# Patient Record
Sex: MALE | Race: WHITE | NOT HISPANIC OR LATINO | Employment: OTHER | URBAN - METROPOLITAN AREA
[De-identification: names, ages, dates, MRNs, and addresses within clinical notes are randomized per-mention and may not be internally consistent; named-entity substitution may affect disease eponyms.]

---

## 2017-12-27 ENCOUNTER — ALLSCRIPTS OFFICE VISIT (OUTPATIENT)
Dept: OTHER | Facility: OTHER | Age: 72
End: 2017-12-27

## 2017-12-27 LAB
BILIRUB UR QL STRIP: NEGATIVE
CLARITY UR: NORMAL
COLOR UR: YELLOW
GLUCOSE (HISTORICAL): NEGATIVE
HGB UR QL STRIP.AUTO: NORMAL
KETONES UR STRIP-MCNC: NEGATIVE MG/DL
LEUKOCYTE ESTERASE UR QL STRIP: NEGATIVE
NITRITE UR QL STRIP: NEGATIVE
PH UR STRIP.AUTO: 6.5 [PH]
PROT UR STRIP-MCNC: NEGATIVE MG/DL
SP GR UR STRIP.AUTO: 1.02
UROBILINOGEN UR QL STRIP.AUTO: 0.2

## 2018-01-23 VITALS
WEIGHT: 187 LBS | DIASTOLIC BLOOD PRESSURE: 84 MMHG | BODY MASS INDEX: 26.77 KG/M2 | HEIGHT: 70 IN | SYSTOLIC BLOOD PRESSURE: 156 MMHG

## 2018-05-27 DIAGNOSIS — C61 MALIGNANT NEOPLASM OF PROSTATE (HCC): ICD-10-CM

## 2018-06-27 ENCOUNTER — OFFICE VISIT (OUTPATIENT)
Dept: UROLOGY | Facility: MEDICAL CENTER | Age: 73
End: 2018-06-27
Payer: MEDICARE

## 2018-06-27 VITALS
BODY MASS INDEX: 27.96 KG/M2 | DIASTOLIC BLOOD PRESSURE: 84 MMHG | WEIGHT: 188.8 LBS | HEIGHT: 69 IN | SYSTOLIC BLOOD PRESSURE: 146 MMHG

## 2018-06-27 DIAGNOSIS — C61 MALIGNANT NEOPLASM OF PROSTATE (HCC): Primary | ICD-10-CM

## 2018-06-27 DIAGNOSIS — Z85.46 HISTORY OF MALIGNANT NEOPLASM OF PROSTATE: ICD-10-CM

## 2018-06-27 LAB
SL AMB  POCT GLUCOSE, UA: NEGATIVE
SL AMB LEUKOCYTE ESTERASE,UA: NEGATIVE
SL AMB POCT BILIRUBIN,UA: NEGATIVE
SL AMB POCT BLOOD,UA: ABNORMAL
SL AMB POCT CLARITY,UA: CLEAR
SL AMB POCT COLOR,UA: YELLOW
SL AMB POCT KETONES,UA: NEGATIVE
SL AMB POCT NITRITE,UA: NEGATIVE
SL AMB POCT PH,UA: 7
SL AMB POCT SPECIFIC GRAVITY,UA: 1.02
SL AMB POCT URINE PROTEIN: NEGATIVE
SL AMB POCT UROBILINOGEN: 1

## 2018-06-27 PROCEDURE — 81003 URINALYSIS AUTO W/O SCOPE: CPT | Performed by: UROLOGY

## 2018-06-27 PROCEDURE — 99215 OFFICE O/P EST HI 40 MIN: CPT | Performed by: UROLOGY

## 2018-06-27 RX ORDER — ASPIRIN 81 MG/1
TABLET ORAL DAILY
COMMUNITY
End: 2020-01-29

## 2018-06-27 RX ORDER — VALSARTAN AND HYDROCHLOROTHIAZIDE 160; 25 MG/1; MG/1
TABLET ORAL DAILY
COMMUNITY
Start: 2018-05-23

## 2018-06-27 RX ORDER — SILDENAFIL CITRATE 20 MG/1
20 TABLET ORAL AS NEEDED
COMMUNITY

## 2018-06-27 RX ORDER — ROSUVASTATIN CALCIUM 20 MG/1
TABLET, COATED ORAL DAILY
COMMUNITY
Start: 2018-05-23

## 2018-06-27 NOTE — PATIENT INSTRUCTIONS
Prostate Cancer   WHAT YOU NEED TO KNOW:   What do I need to know about prostate cancer? The prostate is the male sex gland that helps make semen  It is about the size of a walnut and wraps around the urethra  The urethra is the tube that carries urine from the bladder to the end of the penis  In most cases, prostate cancer is slow growing  What increases my risk for prostate cancer? · Age older than 48 years    · Father or brother with prostate cancer    · Regularly eating fried or high-fat foods    · Eating few fruits and vegetables    · Exposure to high amounts of certain chemicals, such as in cigarette smoke or alkaline batteries    · A sexually transmitted infection (STI)  What are the signs and symptoms of prostate cancer? You may have no symptoms during the early stages  In the later stages, you may have any of the following:  · Trouble starting or stopping the flow of urine    · Feeling the need to urinate often, especially at night    · Pain or a burning feeling when you urinate or ejaculate semen    · Trouble having an erection    · Blood in your urine or semen    · Not being able to urinate at all    · Pain or stiffness in your lower back, hips, or upper thighs  How is prostate cancer diagnosed? · Digital rectal examination (EDUARD)  is a test to check the size and shape of your prostate  Your healthcare provider will insert a gloved finger into your rectum to feel if your prostate is large, firm, or has lumps  · Prostate-specific antigen (PSA)  is a blood test to check PSA levels  These levels may be increased if you have prostate cancer  · A biopsy  is used to take a sample of your prostate gland to be tested for cancer  The sample may also help healthcare providers determine the stage of your cancer  How is prostate cancer treated? If you have early stage cancer, your healthcare provider may recommend that you have frequent tests and regular follow-up visits to watch for changes   You may also need any of the following:  · Hormone therapy  is medicine used to decrease testosterone (male hormone) levels  · Radiation therapy  is used to kill cancer cells with high-energy x-ray beams  You may receive radiation therapy from outside your body or from small beads or rods placed inside your prostate  · Surgery  may be needed, depending on the stage of the cancer  Part or all of your prostate may be removed  You may also need to have some lymph nodes taken out  This may help keep the cancer from spreading to other parts of your body  What can I do to manage my prostate cancer? · Do not smoke  Nicotine can damage blood vessels and make it more difficult to manage your prostate cancer  Smoking also increases your risk for new or returning cancer and delays healing after treatment  Do not use e-cigarettes or smokeless tobacco in place of cigarettes or to help you quit  They still contain nicotine  Ask your healthcare provider for information if you currently smoke and need help quitting  · Limit or do not drink alcohol as directed  Limit alcohol to 2 drinks per day  A drink is 12 ounces of beer, 1½ ounces of liquor, or 5 ounces of wine  · Eat a variety of healthy foods  Healthy foods include fruits, vegetables, whole-grain breads, low-fat dairy products, beans, lean meats, and fish  Your healthcare provider may also recommend changes to the amounts of calcium and vitamin D you have each day  · Manage your weight  Obesity may increase your risk for problems from prostate cancer  Limit or do not have high-calorie foods or drinks  · Exercise as directed  Exercise may help you recover after treatment and may help prevent your prostate cancer from returning  Exercise can also help you manage your weight  Try to get at least 30 minutes of exercise 5 days a week, such as walking  · Ask about sexual activity    Ask your healthcare provider when it is safe for you to start having sex after your treatment  Medicines may be given if you have trouble getting or maintaining an erection  · Manage incontinence  You may have incontinence (trouble controlling when you urinate) after treatment  Ask your healthcare provider for information on managing urinary incontinence  You may be able to gain control over your urination with techniques or medicines  · Drink liquids as directed  Ask how much liquid to drink each day and which liquids are best for you  Drink extra liquids to prevent dehydration  You will also need to replace fluid if you are vomiting or have diarrhea from cancer treatments  Call 911 for any of the following:   · Your leg feels warm, tender, and painful  It may look swollen and red  · You suddenly feel lightheaded and short of breath  · You have chest pain when you take a deep breath or cough  · You cough up blood  When should I contact my healthcare provider? · You have a fever  · You feel you cannot cope with your illness  · You have blood in your urine or have trouble urinating  · You have pain that does not get better after you take your medicine  · You have questions or concerns about your condition or care  CARE AGREEMENT:   You have the right to help plan your care  Learn about your health condition and how it may be treated  Discuss treatment options with your caregivers to decide what care you want to receive  You always have the right to refuse treatment  The above information is an  only  It is not intended as medical advice for individual conditions or treatments  Talk to your doctor, nurse or pharmacist before following any medical regimen to see if it is safe and effective for you  © 2017 [employer] Information is for End User's use only and may not be sold, redistributed or otherwise used for commercial purposes   All illustrations and images included in CareNotes® are the copyrighted property of [employer]  or [employer] Durbin

## 2018-06-27 NOTE — ASSESSMENT & PLAN NOTE
The patient is on active surveillance for his prostate cancer  His PSA has plateaued  He is known to have a very large prostate, 95 g by ultrasound in 2016  His PSA velocity and PSA density year consistent with benign disease  He will return in 6 months for re-evaluation

## 2018-06-27 NOTE — PROGRESS NOTES
Assessment/Plan:    Malignant neoplasm of prostate [facility])  The patient is on active surveillance for his prostate cancer  His PSA has plateaued  He is known to have a very large prostate, 95 g by ultrasound in 2016  His PSA velocity and PSA density year consistent with benign disease  He will return in 6 months for re-evaluation  Diagnoses and all orders for this visit:    Malignant neoplasm of prostate ([address] )  -     PSA, Total Screen; Future    History of malignant neoplasm of prostate  -     POCT urine dip auto non-scope    Other orders  -     rosuvastatin (CRESTOR) 20 MG tablet; daily  -     valsartan-hydrochlorothiazide (DIOVAN-HCT) 160-25 MG per tablet; daily  -     aspirin (ECOTRIN LOW STRENGTH) 81 mg EC tablet; Take by mouth daily  -     CALCIUM PO; Take by mouth daily  -     Ascorbic Acid (MAC-C PO); Take by mouth  -     sildenafil (REVATIO) 20 mg tablet; Take 20 mg by mouth as needed Take 3-5 tablets prn          Subjective:      Patient ID: Shiv Perry is a 71 y o  male  HPI  Prostate cancer:   Patient has been on active surveillance for approximately 4 years  He has no signs or symptoms of advancing disease  His PSA in May 2017 was 9 79  In December 2017 it was 11 90 and current level is 11 10  As of Dec 2017, PSA DT was about 4 years using last bx as endpoint  He notes urinary frequency, nocturia x 2  He denies other significant urinary symptoms  He denies gross hematuria, urinary tract infections or incontinence  He is taking  Neither medications nor supplements for his symptoms  Erectile dysfunction:   Using sildenafil successfully        The following portions of the patient's history were reviewed and updated as appropriate: allergies, current medications, past family history, past medical history, past social history, past surgical history and problem list     Review of Systems   Constitutional: Negative for activity change and fatigue     Respiratory: Negative for shortness of breath and wheezing  Cardiovascular: Negative for chest pain  HTN  Gastrointestinal: Negative for abdominal pain  Genitourinary: Negative for difficulty urinating, dysuria, frequency, hematuria and urgency  Musculoskeletal: Negative for back pain and gait problem  Skin: Negative  Allergic/Immunologic: Negative  Neurological: Negative  Psychiatric/Behavioral: Negative  Objective:      /84 (BP Location: Left arm, Patient Position: Sitting, Cuff Size: Standard)   Ht 5' 9" (1 753 m)   Wt 85 6 kg (188 lb 12 8 oz)   BMI 27 88 kg/m²          Physical Exam   Constitutional: He is oriented to person, place, and time  He appears well-developed and well-nourished  HENT:   Head: Normocephalic and atraumatic  Neck: Normal range of motion  Neck supple  Pulmonary/Chest: Effort normal    Genitourinary: Rectum normal    Genitourinary Comments: The prostate is normal in texture, estimated volume of 70 g  The right side is 2+ enlarged and the left side is 3+ enlarged  On previous exam the right side was noted to be a bit irregular, but that irregularity has disappeared  The tissue consistency is normal    Musculoskeletal: Normal range of motion  Neurological: He is alert and oriented to person, place, and time  He has normal reflexes  Skin: Skin is warm and dry  Psychiatric: He has a normal mood and affect   His behavior is normal  Judgment and thought content normal

## 2018-06-27 NOTE — LETTER
June 27, 2018     Arlet Razo MD  [address]    Patient: Malinda Osei   YOB: 1945   Date of Visit: 6/27/2018       Dear Dr Jovita Parrish: Thank you for referring Alexus Carlos to me for evaluation  Below are my notes for this consultation  If you have questions, please do not hesitate to call me  I look forward to following your patient along with you  Sincerely,        Michael Gar MD        CC: No Recipients  Michael Gar MD  6/27/2018 10:01 AM  Sign at close encounter  Assessment/Plan:    Malignant neoplasm of prostate [facility])  The patient is on active surveillance for his prostate cancer  His PSA has plateaued  He is known to have a very large prostate, 95 g by ultrasound in 2016  His PSA velocity and PSA density year consistent with benign disease  He will return in 6 months for re-evaluation  Diagnoses and all orders for this visit:    Malignant neoplasm of prostate ([facility] [address] )  -     PSA, Total Screen; Future    History of malignant neoplasm of prostate  -     POCT urine dip auto non-scope    Other orders  -     rosuvastatin (CRESTOR) 20 MG tablet; daily  -     valsartan-hydrochlorothiazide (DIOVAN-HCT) 160-25 MG per tablet; daily  -     aspirin (ECOTRIN LOW STRENGTH) 81 mg EC tablet; Take by mouth daily  -     CALCIUM PO; Take by mouth daily  -     Ascorbic Acid (MAC-C PO); Take by mouth  -     sildenafil (REVATIO) 20 mg tablet; Take 20 mg by mouth as needed Take 3-5 tablets prn          Subjective:      Patient ID: Malinda Osei is a 71 y o  male  HPI  Prostate cancer:   Patient has been on active surveillance for approximately 4 years  He has no signs or symptoms of advancing disease  His PSA in May 2017 was 9 79  In December 2017 it was 11 90 and current level is 11 10  As of Dec 2017, PSA DT was about 4 years using last bx as endpoint  He notes urinary frequency, nocturia x 2    He denies other significant urinary symptoms  He denies gross hematuria, urinary tract infections or incontinence  He is taking  Neither medications nor supplements for his symptoms  Erectile dysfunction:   Using sildenafil successfully        The following portions of the patient's history were reviewed and updated as appropriate: allergies, current medications, past family history, past medical history, past social history, past surgical history and problem list     Review of Systems   Constitutional: Negative for activity change and fatigue  Respiratory: Negative for shortness of breath and wheezing  Cardiovascular: Negative for chest pain  HTN  Gastrointestinal: Negative for abdominal pain  Genitourinary: Negative for difficulty urinating, dysuria, frequency, hematuria and urgency  Musculoskeletal: Negative for back pain and gait problem  Skin: Negative  Allergic/Immunologic: Negative  Neurological: Negative  Psychiatric/Behavioral: Negative  Objective:      /84 (BP Location: Left arm, Patient Position: Sitting, Cuff Size: Standard)   Ht 5' 9" (1 753 m)   Wt 85 6 kg (188 lb 12 8 oz)   BMI 27 88 kg/m²           Physical Exam   Constitutional: He is oriented to person, place, and time  He appears well-developed and well-nourished  HENT:   Head: Normocephalic and atraumatic  Neck: Normal range of motion  Neck supple  Pulmonary/Chest: Effort normal    Genitourinary: Rectum normal    Genitourinary Comments: The prostate is normal in texture, estimated volume of 70 g  The right side is 2+ enlarged and the left side is 3+ enlarged  On previous exam the right side was noted to be a bit irregular, but that irregularity has disappeared  The tissue consistency is normal    Musculoskeletal: Normal range of motion  Neurological: He is alert and oriented to person, place, and time  He has normal reflexes  Skin: Skin is warm and dry  Psychiatric: He has a normal mood and affect   His behavior is normal  Judgment and thought content normal

## 2018-06-27 NOTE — PROGRESS NOTES
IPSS Questionnaire (AUA-7): Over the past month    1)  How often have you had a sensation of not emptying your bladder completely after you finish urinating? 1 - Less than 1 time in 5   2)  How often have you had to urinate again less than two hours after you finished urinating? 2 - Less than half the time   3)  How often have you found you stopped and started again several times when you urinated? 1 - Less than 1 time in 5   4) How difficult have you found it to postpone urination? 1 - Less than 1 time in 5   5) How often have you had a weak urinary stream?  2 - Less than half the time   6) How often have you had to push or strain to begin urination? 0 - Not at all   7) How many times did you most typically get up to urinate from the time you went to bed until the time you got up in the morning?  0 - None   Total Score:  7     QOL: Pleased

## 2019-01-04 ENCOUNTER — OFFICE VISIT (OUTPATIENT)
Dept: UROLOGY | Facility: MEDICAL CENTER | Age: 74
End: 2019-01-04
Payer: MEDICARE

## 2019-01-04 VITALS
DIASTOLIC BLOOD PRESSURE: 78 MMHG | SYSTOLIC BLOOD PRESSURE: 138 MMHG | HEIGHT: 69 IN | WEIGHT: 182 LBS | BODY MASS INDEX: 26.96 KG/M2 | HEART RATE: 62 BPM

## 2019-01-04 DIAGNOSIS — C61 MALIGNANT NEOPLASM OF PROSTATE (HCC): Primary | ICD-10-CM

## 2019-01-04 LAB
SL AMB  POCT GLUCOSE, UA: ABNORMAL
SL AMB LEUKOCYTE ESTERASE,UA: ABNORMAL
SL AMB POCT BILIRUBIN,UA: ABNORMAL
SL AMB POCT BLOOD,UA: ABNORMAL
SL AMB POCT CLARITY,UA: CLEAR
SL AMB POCT COLOR,UA: YELLOW
SL AMB POCT KETONES,UA: ABNORMAL
SL AMB POCT NITRITE,UA: ABNORMAL
SL AMB POCT PH,UA: 7
SL AMB POCT SPECIFIC GRAVITY,UA: 1.01
SL AMB POCT URINE PROTEIN: ABNORMAL
SL AMB POCT UROBILINOGEN: 0.2

## 2019-01-04 PROCEDURE — 81003 URINALYSIS AUTO W/O SCOPE: CPT | Performed by: UROLOGY

## 2019-01-04 PROCEDURE — 99214 OFFICE O/P EST MOD 30 MIN: CPT | Performed by: UROLOGY

## 2019-01-04 NOTE — PROGRESS NOTES
Assessment/Plan:    Malignant neoplasm of prostate ([facility] [address] )  Continue active surveillance  The patient has elevated PSA is consistent with the size of his prostate  PSA doubling time of 6 6 years indicates essentially benign disease  Diagnoses and all orders for this visit:    Malignant neoplasm of prostate ([facility] [address] )  -     POCT urine dip auto non-scope  -     PSA, Total Screen; Future          Subjective:      Patient ID: Rubi Ruiz is a 68 y o  male  HPI  Prostate cancer:   Patient has been on active surveillance for approximately 4 years  He has no signs or symptoms of advancing disease  His PSA in May 2017 was 9 79  In December 2017 it was 11 90 and PSA on December 19, 2018 was 12  6       The patient had a PSA of 9 55 in September 2016  A biopsy in November 2016 was benign  The patient's prostate measured 95 g at that time  PSA doubling time since his last biopsy is 6 6 years, consistent with largely benign disease  PSA density 0 13  He notes urinary frequency, nocturia x 1  He denies other significant urinary symptoms  He denies gross hematuria, urinary tract infections or incontinence  He is taking neither medications nor supplements for his symptoms  AUA SYMPTOM SCORE      Most Recent Value   AUA SYMPTOM SCORE   How often have you had a sensation of not emptying your bladder completely after you finished urinating? 0   How often have you had to urinate again less than two hours after you finished urinating? 2   How often have you found you stopped and started again several times when you urinate? 1   How often have you found it difficult to postpone urination? 1   How often have you had a weak urinary stream?  1   How often have you had to push or strain to begin urination? 0   How many times did you most typically get up to urinate from the time you went to bed at night until the time you got up in the morning?   1   Quality of Life: If you were to spend the rest of your life with your urinary condition just the way it is now, how would you feel about that?  2   AUA SYMPTOM SCORE  6            Erectile dysfunction:   Using sildenafil successfully  The following portions of the patient's history were reviewed and updated as appropriate: allergies, current medications, past family history, past medical history, past social history, past surgical history and problem list     Review of Systems   Constitutional: Negative for activity change and fatigue  Respiratory: Negative for shortness of breath and wheezing  Cardiovascular: Negative for chest pain  Hypertension  Gastrointestinal: Negative for abdominal pain  Genitourinary: Negative for difficulty urinating, dysuria, frequency, hematuria and urgency  Musculoskeletal: Negative for back pain and gait problem  Skin: Negative  Allergic/Immunologic: Negative  Neurological: Negative  Psychiatric/Behavioral: Negative  Objective:      /78 (BP Location: Left arm, Patient Position: Sitting, Cuff Size: Adult)   Pulse 62   Ht 5' 9" (1 753 m)   Wt 82 6 kg (182 lb)   BMI 26 88 kg/m²          Physical Exam   Constitutional: He is oriented to person, place, and time  He appears well-developed and well-nourished  HENT:   Head: Normocephalic and atraumatic  Eyes: EOM are normal    Neck: Normal range of motion  Neck supple  Pulmonary/Chest: Effort normal    Genitourinary: Rectum normal    Genitourinary Comments: The prostate is 80 g, firm, smooth, nontender  I am unable to feel the base  The prostate measured 95 g by ultrasound in November 2016  Musculoskeletal: Normal range of motion  Neurological: He is alert and oriented to person, place, and time  Skin: Skin is warm and dry  Psychiatric: He has a normal mood and affect   His behavior is normal  Judgment and thought content normal

## 2019-01-05 NOTE — ASSESSMENT & PLAN NOTE
Continue active surveillance  The patient has elevated PSA is consistent with the size of his prostate  PSA doubling time of 6 6 years indicates essentially benign disease

## 2019-03-03 ENCOUNTER — OFFICE VISIT (OUTPATIENT)
Dept: URGENT CARE | Facility: MEDICAL CENTER | Age: 74
End: 2019-03-03
Payer: MEDICARE

## 2019-03-03 VITALS
SYSTOLIC BLOOD PRESSURE: 130 MMHG | WEIGHT: 182 LBS | DIASTOLIC BLOOD PRESSURE: 68 MMHG | HEIGHT: 69 IN | OXYGEN SATURATION: 95 % | RESPIRATION RATE: 16 BRPM | BODY MASS INDEX: 26.96 KG/M2 | TEMPERATURE: 96.9 F | HEART RATE: 67 BPM

## 2019-03-03 DIAGNOSIS — L02.415 CELLULITIS AND ABSCESS OF RIGHT LEG: Primary | ICD-10-CM

## 2019-03-03 DIAGNOSIS — L03.115 CELLULITIS AND ABSCESS OF RIGHT LEG: Primary | ICD-10-CM

## 2019-03-03 PROCEDURE — 90715 TDAP VACCINE 7 YRS/> IM: CPT

## 2019-03-03 PROCEDURE — 99213 OFFICE O/P EST LOW 20 MIN: CPT | Performed by: FAMILY MEDICINE

## 2019-03-03 PROCEDURE — G0463 HOSPITAL OUTPT CLINIC VISIT: HCPCS | Performed by: FAMILY MEDICINE

## 2019-03-03 RX ORDER — AZITHROMYCIN 250 MG/1
TABLET, FILM COATED ORAL
Qty: 6 TABLET | Refills: 0 | Status: SHIPPED | OUTPATIENT
Start: 2019-03-03 | End: 2019-03-07

## 2019-03-03 NOTE — PROGRESS NOTES
[address]        NAME: Morena Mckeon is a 68 y o  male  : 1945    MRN: [id number]  DATE: March 3, 2019  TIME: 2:22 PM    Assessment and Plan   Cellulitis and abscess of right leg [L03 115, L02 415]  1  Cellulitis and abscess of right leg  TDAP Vaccine greater than or equal to 6yo    azithromycin (ZITHROMAX) 250 mg tablet         Patient Instructions       Follow up with PCP in 3-5 days  Proceed to  ER if symptoms worsen  Chief Complaint     Chief Complaint   Patient presents with    Leg Injury     Patient here with a right leg injury from yesterday  Sates a metal chair hit his leg  History of Present Illness       Patient here today because he sustained right leg injury when he scratches right lower leg on a metal chair when he was arriving from a trip from [facility] at the airport  She denies any significant bleeding  However area around the right shin bone is tender to touch  He cannot recall the last time he had a tetanus  Denies any fever chills  Review of Systems   Review of Systems   Skin: Positive for rash and wound           Current Medications       Current Outpatient Medications:     Ascorbic Acid (MAC-C PO), Take by mouth, Disp: , Rfl:     aspirin (ECOTRIN LOW STRENGTH) 81 mg EC tablet, Take by mouth daily, Disp: , Rfl:     CALCIUM PO, Take by mouth daily, Disp: , Rfl:     rosuvastatin (CRESTOR) 20 MG tablet, daily, Disp: , Rfl:     valsartan-hydrochlorothiazide (DIOVAN-HCT) 160-25 MG per tablet, daily, Disp: , Rfl:     azithromycin (ZITHROMAX) 250 mg tablet, Take 2 tablets today then 1 tablet daily x 4 days, Disp: 6 tablet, Rfl: 0    sildenafil (REVATIO) 20 mg tablet, Take 20 mg by mouth as needed Take 3-5 tablets prn, Disp: , Rfl:     Current Allergies     Allergies as of 2019 - Reviewed 2019   Allergen Reaction Noted    Penicillins  2017            The following portions of the patient's history were reviewed and updated as appropriate: allergies, current medications, past family history, past medical history, past social history, past surgical history and problem list      Past Medical History:   Diagnosis Date    BPH without obstruction/lower urinary tract symptoms     Elevated PSA     Erectile dysfunction     Hypercholesteremia     Hypertension     Malignant neoplasm prostate ([address] )     Prostate nodule        Past Surgical History:   Procedure Laterality Date    PROSTATE BIOPSY Bilateral 2014, 2016       Family History   Problem Relation Age of Onset    Breast cancer Sister     Prostate cancer Brother     Heart attack Brother          Medications have been verified  Objective   /68   Pulse 67   Temp (!) 96 9 °F (36 1 °C) (Tympanic)   Resp 16   Ht 5' 9" (1 753 m)   Wt 82 6 kg (182 lb)   SpO2 95%   BMI 26 88 kg/m²        Physical Exam     Physical Exam   Skin: Skin is warm  Right pretibial area reveals superficial abrasion measuring about 8 cm in length by 1 cm why  It is slightly erythematous swollen pretibial area 1 to 2+  No calf tenderness elicited  Findings consistent with cellulitis

## 2019-03-15 ENCOUNTER — TRANSCRIBE ORDERS (OUTPATIENT)
Dept: ADMINISTRATIVE | Facility: HOSPITAL | Age: 74
End: 2019-03-15

## 2019-03-15 DIAGNOSIS — R06.83 SNORING: Primary | ICD-10-CM

## 2019-07-26 ENCOUNTER — OFFICE VISIT (OUTPATIENT)
Dept: SLEEP CENTER | Facility: CLINIC | Age: 74
End: 2019-07-26
Payer: MEDICARE

## 2019-07-26 VITALS
SYSTOLIC BLOOD PRESSURE: 128 MMHG | DIASTOLIC BLOOD PRESSURE: 68 MMHG | HEIGHT: 69 IN | WEIGHT: 186 LBS | BODY MASS INDEX: 27.55 KG/M2 | HEART RATE: 70 BPM

## 2019-07-26 DIAGNOSIS — R06.83 SNORING: ICD-10-CM

## 2019-07-26 DIAGNOSIS — G47.19 EXCESSIVE DAYTIME SLEEPINESS: ICD-10-CM

## 2019-07-26 DIAGNOSIS — F41.9 ANXIETY: ICD-10-CM

## 2019-07-26 DIAGNOSIS — G25.81 RESTLESS LEG SYNDROME: ICD-10-CM

## 2019-07-26 DIAGNOSIS — E66.3 OVERWEIGHT (BMI 25.0-29.9): ICD-10-CM

## 2019-07-26 DIAGNOSIS — G47.33 OSA (OBSTRUCTIVE SLEEP APNEA): Primary | ICD-10-CM

## 2019-07-26 PROCEDURE — 99204 OFFICE O/P NEW MOD 45 MIN: CPT | Performed by: INTERNAL MEDICINE

## 2019-07-26 NOTE — PROGRESS NOTES
Review of Systems      Genitourinary none   Cardiology none   Gastrointestinal none   Neurology none   Constitutional fatigue   Integumentary none   Psychiatry anxiety   Musculoskeletal back pain   Pulmonary snoring   ENT none   Endocrine none   Hematological none

## 2019-07-26 NOTE — PROGRESS NOTES
Consultation - Spencer Chappell  68 y o  male  :1945  MIX:[id number]    Physician Requesting Consult: Mark Fletcher MD     Reason for Consult : At your kind request I saw this patient for initial sleep evaluation today  He is here to evaluate for suspected Obstructive Sleep Apnea  PFSH, Problem List, Medications & Allergies were reviewed in EMR  He  has a past medical history of BPH without obstruction/lower urinary tract symptoms, Elevated PSA, Erectile dysfunction, Hypercholesteremia, Hypertension, Malignant neoplasm prostate ([address] ), and Prostate nodule  He has a current medication list which includes the following prescription(s): ascorbic acid, aspirin, calcium, rosuvastatin, sildenafil, and valsartan-hydrochlorothiazide  HPI:  Wife reports he has been snoring forever  It is loud and disturbs her to the extent that she frequently has to sleep in another room  He is not aware of snoring, breathing difficulties during sleep or modifying factors  Other Complaints:  Recent anxiety associated with a move for which she was started on mirtazapine  Restless Leg Syndrome: has typical symptoms but occurring rarely and not affecting sleep Parasomnia: no features reported   Sleep Routine:  He dozes for approximately an hour or 2 in the evenings Typical Bedtime:11:00 p m  Gets OOB:  5:30 a m  TIB:6 5 hrs  Sleep latency:< 15 minutes Sleep Interruptions:infrequent x/night   Awakens: with aid of an alarm  Upon awakening:is not always refreshed He estimates getting >7 5 hrs sleep  He has Excessive Daytime Sleepiness and naps in the afternoons when he has the opportunity  [place] Sleepiness Scale rated at Total score: 24  Habits: reports that he has never smoked  He has never used smokeless tobacco , reports that he drinks alcohol ,  reports that he does not use drugs  ,Caffeine use:limited , Exercise routine: none but is physically active in his part-time job  Family History:  Son has obstructive sleep apnea  ROS: reviewed & as attached  Significant for weight has been stable  He reported no nasal, respiratory or cardiac symptoms  Anxiety is controlled on current medication  EXAM:    Vitals /68   Pulse 70   Ht 5' 9" (1 753 m)   Wt 84 4 kg (186 lb)   BMI 27 47 kg/m²     General  Well groomed male; appears stated age; no apparent distress  Psychiatric   Speech:clear and coherent; Cooperative  Normal mood, affect & thought    Head   Craniofacial anatomy:normal Sinuses: non- tender  TMJ: Normal     Eyes   EOM's intact;  conjunctiva/corneas clear         Nasal Airway  is patent Septum:central, Mucous membranes:appear normal     Turbinates: normal ;  No Rhinorrhea; No PND  Oral   Airway  crowded Tongue:  ; Modified Mallampati class IV (only hard palate visible)  Hard Palate:normal ;Soft Palate:  redundant soft palate Tonsils: no hypertrophy  Teeth: normal       Neck  is lean; Neck Circumference: 14cm; Supple; no abnormal masses; Thyroid:normal  Trachea:central      Lymph    No Cervical or Submandibular Lymhadenopathy   Heart:   S1,S2 normal;RRR; no gallop; nomurmurs     Lungs   Respiratory Effort:normal  Air entry good bilaterally  No wheezes  No rales   Abdomen   Obese, Soft & non-tender     Extremities   No pedal edema  No clubbing or cyanosis  Skin   Skin is warm and dry; Color& Hydration good; no facial rashes or lesions    Neurologic  Alert and orientated; CNII-XII intact; Motor normal; Sensation normal    Muscskeltl    Muscle bulk, tone and power WNL Gait:normal           IMPRESSION: Primary Sleep/Secondary(to Medical or Psych conditions) & comorbidities   1  GABRIELLE (obstructive sleep apnea)  Diagnostic Sleep Study    CPAP Study   2  Snoring  Ambulatory referral to Sleep Medicine   3  Excessive daytime sleepiness     4  Restless leg syndrome     5  Anxiety     6  Overweight (BMI 25 0-29  9)        PLAN:   1   Comprehensive counseling was provided on pathophysiology, diagnostic strategies & treatment options; effects on symptoms and comorbidities; risks of inadequate therapy; costs and insurance aspects  2  I advised on weight reduction, avoiding sleeping supine, using alcohol or sedating medications close to bed time and on safe driving practices  3  Nocturnal polysomnography is indicated and a diagnostic study will be scheduled  4  Patient is willing to try Positive airway pressure therapy and will be scheduled for a titration study if indicated  5  Once treated, he may be able to discontinue mirtazapine  6  Follow-up will be scheduled after the studies to review results, further details of treatment options and to initiate/adjust therapy  Thank you for allowing me to participate in the care of this patient  I will keep you apprised of developments      Sincerely,     Authenticated electronically by Lexie Ariza MD   on 77/88/58   Board Certified Specialist

## 2019-09-18 ENCOUNTER — HOSPITAL ENCOUNTER (OUTPATIENT)
Dept: SLEEP CENTER | Facility: CLINIC | Age: 74
Discharge: HOME | End: 2019-09-18
Payer: MEDICARE

## 2019-09-18 DIAGNOSIS — G47.33 OSA (OBSTRUCTIVE SLEEP APNEA): ICD-10-CM

## 2019-09-18 PROCEDURE — 95810 POLYSOM 6/> YRS 4/> PARAM: CPT

## 2019-09-19 NOTE — PROGRESS NOTES
Sleep Study Documentation    Pre-Sleep Study       Sleep testing procedure explained to patient:YES    Patient napped prior to study:NO    Caffeine:Dayshift worker after 12PM   Caffeine use:YES- coffee  6 ounces and soda  12 ounces    Alcohol:Dayshift workers after 5PM: Alcohol use:NO    Typical day for patient:YES       Study Documentation    Sleep Study Indications: BMI > 30, EDS, Un-refreshing Sleep    Sleep Study: Diagnostic   Snore: Moderate  Supplemental O2: no    O2 flow rate (L/min) range N/A  O2 flow rate (L/min) final N/A  Minimum SaO2 81%  Baseline SaO2 98%        Mode of Therapy: N/A    EKG abnormalities: no     EEG abnormalities: no    Sleep Study Recorded < 2 hours: N/A    Sleep Study Recorded > 2 hours but incomplete study: N/A    Sleep Study Recorded 6 hours but no sleep obtained: NO    Patient classification: retired       Post-Sleep Study    Medication used at bedtime or during sleep study:YES other prescription medications    Patient reports time it took to fall asleep:less than 20 minutes    Patient reports waking up during study:1 to 2 times  Patient reports returning to sleep without difficulty  Patient reports sleeping 4 to 6 hours without dreaming  Patient reports sleep during study:typical    Patient rated sleepiness: Somewhat sleepy or tired    PAP treatment:no

## 2019-09-24 ENCOUNTER — TELEPHONE (OUTPATIENT)
Dept: SLEEP CENTER | Facility: CLINIC | Age: 74
End: 2019-09-24

## 2019-09-24 NOTE — TELEPHONE ENCOUNTER
Left message for patient, advised sleep study reveals GABRIELLE, recommend titration study, already scheduled for 10/29/19 in [place]  Advised to call with any questions or concerns

## 2019-09-25 NOTE — TELEPHONE ENCOUNTER
Patient called back he rescheduled his sleep study because of vacation conflict     Rescheduled patient for first available sleep study in January

## 2019-12-27 ENCOUNTER — TELEPHONE (OUTPATIENT)
Dept: UROLOGY | Facility: MEDICAL CENTER | Age: 74
End: 2019-12-27

## 2019-12-27 DIAGNOSIS — C61 MALIGNANT NEOPLASM OF PROSTATE (HCC): Primary | ICD-10-CM

## 2019-12-27 NOTE — TELEPHONE ENCOUNTER
Patient of Dr Carlos Finch  Patient appointment changed to 01/29/20 and he is requesting date on psa order to be updated  Patient does not go to [address] [place] [address] and he is requesting a copy to be mailed

## 2019-12-27 NOTE — TELEPHONE ENCOUNTER
Called and spoke to patient  New script was placed in the chart and is being mailed to address on file  Address confirmed  Patient understood and will call with any questions

## 2020-01-10 ENCOUNTER — HOSPITAL ENCOUNTER (OUTPATIENT)
Dept: SLEEP CENTER | Facility: CLINIC | Age: 75
Discharge: HOME | End: 2020-01-10
Payer: MEDICARE

## 2020-01-10 DIAGNOSIS — G47.33 OSA (OBSTRUCTIVE SLEEP APNEA): ICD-10-CM

## 2020-01-10 PROCEDURE — 95811 POLYSOM 6/>YRS CPAP 4/> PARM: CPT

## 2020-01-11 DIAGNOSIS — G47.33 OSA (OBSTRUCTIVE SLEEP APNEA): Primary | ICD-10-CM

## 2020-01-11 NOTE — PROGRESS NOTES
Sleep Study Documentation    Pre-Sleep Study       Sleep testing procedure explained to patient:YES    Patient napped prior to study:YES- less than 30 minutes  Napped after 2PM: no    Caffeine:Dayshift worker after 12PM   Caffeine use:YES- coffee  6 to 18 ounces    Alcohol:Dayshift workers after 5PM: Alcohol use:NO    Typical day for patient:YES       Study Documentation    Sleep Study Indications:  GABRIELLE-diagnosed in-lab study [facility]    Sleep Study: Treatment   Optimal PAP pressure:  9 cm H2O  Leak:Small  Snore:Eliminated  REM Obtained:yes  Supplemental O2: no    Minimum SaO2  88 %  Baseline SaO2  94 9 %  PAP mask tried (list all)  [employer] 2 Medium Nasal Mask and heated humidity  PAP mask choice (final) same  PAP mask type:nasal  PAP pressure at which snoring was eliminated  7 cm H2O  Minimum SaO2 at final PAP pressure  93 %  Mode of Therapy:CPAP  ETCO2:No  CPAP changed to BiPAP:No    Mode of Therapy:CPAP    EKG abnormalities: no     EEG abnormalities: no    Sleep Study Recorded < 2 hours: N/A    Sleep Study Recorded > 2 hours but incomplete study: N/A    Sleep Study Recorded 6 hours but no sleep obtained: NO          Post-Sleep Study    Medication used at bedtime or during sleep study:YES other prescription medications    Patient reports time it took to fall asleep:less than 20 minutes    Patient reports waking up during study:1 to 2 times  Patient reports returning to sleep in 10 to 30 minutes  Patient reports sleeping 4 to 6 hours without dreaming  Patient reports sleep during study:typical    Patient rated sleepiness: Somewhat sleepy or tired    PAP treatment:yes: Post PAP treatment patient reports feeling unchanged and would wear PAP mask at home

## 2020-01-13 ENCOUNTER — TELEPHONE (OUTPATIENT)
Dept: SLEEP CENTER | Facility: CLINIC | Age: 75
End: 2020-01-13

## 2020-01-14 ENCOUNTER — TELEPHONE (OUTPATIENT)
Dept: SLEEP CENTER | Facility: CLINIC | Age: 75
End: 2020-01-14

## 2020-01-14 NOTE — TELEPHONE ENCOUNTER
Left message for patient to call office  Dr Micky Myles recommends CPAP 9 cm  Patient will need follow up and DME set up

## 2020-01-15 ENCOUNTER — TELEPHONE (OUTPATIENT)
Dept: SLEEP CENTER | Facility: CLINIC | Age: 75
End: 2020-01-15

## 2020-01-15 NOTE — TELEPHONE ENCOUNTER
Spoke with patient  Advised that Dr Missy Ghosh recommends CPAP  DME set up and compliance scheduled      DME appointment information emailed to [facility]

## 2020-01-29 ENCOUNTER — OFFICE VISIT (OUTPATIENT)
Dept: UROLOGY | Facility: MEDICAL CENTER | Age: 75
End: 2020-01-29
Payer: MEDICARE

## 2020-01-29 VITALS
HEIGHT: 69 IN | SYSTOLIC BLOOD PRESSURE: 130 MMHG | HEART RATE: 66 BPM | DIASTOLIC BLOOD PRESSURE: 82 MMHG | WEIGHT: 186 LBS | BODY MASS INDEX: 27.55 KG/M2

## 2020-01-29 DIAGNOSIS — Z85.46 HISTORY OF MALIGNANT NEOPLASM OF PROSTATE: ICD-10-CM

## 2020-01-29 DIAGNOSIS — C61 MALIGNANT NEOPLASM OF PROSTATE (HCC): Primary | ICD-10-CM

## 2020-01-29 LAB
SL AMB  POCT GLUCOSE, UA: NEGATIVE
SL AMB LEUKOCYTE ESTERASE,UA: NEGATIVE
SL AMB POCT BILIRUBIN,UA: NEGATIVE
SL AMB POCT BLOOD,UA: NEGATIVE
SL AMB POCT CLARITY,UA: CLEAR
SL AMB POCT COLOR,UA: YELLOW
SL AMB POCT KETONES,UA: NEGATIVE
SL AMB POCT NITRITE,UA: NEGATIVE
SL AMB POCT PH,UA: 6.5
SL AMB POCT SPECIFIC GRAVITY,UA: >=1.03
SL AMB POCT URINE PROTEIN: NEGATIVE
SL AMB POCT UROBILINOGEN: 0.2

## 2020-01-29 PROCEDURE — 99214 OFFICE O/P EST MOD 30 MIN: CPT | Performed by: UROLOGY

## 2020-01-29 PROCEDURE — 81003 URINALYSIS AUTO W/O SCOPE: CPT | Performed by: UROLOGY

## 2020-01-29 NOTE — ASSESSMENT & PLAN NOTE
PSA continues to rise albeit gradually  Prolonged doubling time most consistent with benign disease  The prostate has not been imaged in for years and an MRI has been ordered

## 2020-01-29 NOTE — PROGRESS NOTES
Assessment/Plan:    Malignant neoplasm of prostate ([facility]CC)  PSA continues to rise albeit gradually  Prolonged doubling time most consistent with benign disease  The prostate has not been imaged in for years and an MRI has been ordered  Diagnoses and all orders for this visit:    Malignant neoplasm of prostate ([address] )  -     POCT urine dip auto non-scope  -     MRI prostate multiparametric wo w contrast; Future  -     PSA, Total Screen; Future  -     Basic metabolic panel; Future    History of malignant neoplasm of prostate          Subjective:      Patient ID: Eligio Bell is a 69 y o  male  HPI  Prostate cancer:   The patient had a PSA of 9 55 on August 24, 2016  A biopsy in November 2016 showed [place] 6 carcinoma in 1 core  The diagnosis was confirmed at [facility]  The patient's prostate measured 95 g at that time  Patient has been on active surveillance for approximately since November 2016  He has no signs or symptoms of advancing disease   His PSA in May 2017 was 9 79   In December 2017 it was 11 90 and PSA on December 19, 2018 was 12  6       He notes urinary frequency and nocturia x 1  He denies other significant urinary symptoms  He denies gross hematuria, urinary tract infections or incontinence  He is taking neither medications nor supplements for his symptoms  PSA:  13 30 on January 15, 2020  PSA doubling time since his last biopsy is 7 1 years, consistent with largely benign disease  AUA SYMPTOM SCORE      Most Recent Value   AUA SYMPTOM SCORE   How often have you had a sensation of not emptying your bladder completely after you finished urinating? 1   How often have you had to urinate again less than two hours after you finished urinating? 2   How often have you found you stopped and started again several times when you urinate?  0   How often have you found it difficult to postpone urination?   1   How often have you had a weak urinary stream?  1   How often have you had to push or strain to begin urination? 0   How many times did you most typically get up to urinate from the time you went to bed at night until the time you got up in the morning? 1   Quality of Life: If you were to spend the rest of your life with your urinary condition just the way it is now, how would you feel about that?  1   AUA SYMPTOM SCORE  6          The following portions of the patient's history were reviewed and updated as appropriate: allergies, current medications, past family history, past medical history, past social history, past surgical history and problem list     Review of Systems   Constitutional: Negative for activity change and fatigue  Respiratory: Negative for shortness of breath and wheezing  Cardiovascular: Negative for chest pain  Hypertension  Gastrointestinal: Negative for abdominal pain  Genitourinary: Negative for difficulty urinating, dysuria, frequency, hematuria and urgency  Musculoskeletal: Negative for back pain and gait problem  Skin: Negative  Allergic/Immunologic: Negative  Neurological: Negative  Psychiatric/Behavioral: Negative  Objective:      /82 (BP Location: Left arm, Patient Position: Sitting, Cuff Size: Standard)   Pulse 66   Ht 5' 9" (1 753 m)   Wt 84 4 kg (186 lb)   BMI 27 47 kg/m²          Physical Exam   Constitutional: He is oriented to person, place, and time  He appears well-developed and well-nourished  HENT:   Head: Normocephalic and atraumatic  Eyes: EOM are normal    Neck: Normal range of motion  Neck supple  Pulmonary/Chest: Effort normal    Genitourinary: Rectum normal    Genitourinary Comments: The prostate is 80 g, firm, smooth, nontender  Right side a bit larger than the left  It was 95 gm by u/s 2016  Musculoskeletal: Normal range of motion  Neurological: He is alert and oriented to person, place, and time  Skin: Skin is warm and dry     Psychiatric: He has a normal mood and affect   His behavior is normal  Judgment and thought content normal

## 2020-02-05 ENCOUNTER — TELEPHONE (OUTPATIENT)
Dept: SLEEP CENTER | Facility: CLINIC | Age: 75
End: 2020-02-05

## 2020-02-14 ENCOUNTER — HOSPITAL ENCOUNTER (OUTPATIENT)
Dept: RADIOLOGY | Age: 75
Discharge: HOME | End: 2020-02-14
Payer: MEDICARE

## 2020-02-14 DIAGNOSIS — C61 MALIGNANT NEOPLASM OF PROSTATE (HCC): ICD-10-CM

## 2020-02-14 PROCEDURE — 76377 3D RENDER W/INTRP POSTPROCES: CPT

## 2020-02-14 PROCEDURE — 72197 MRI PELVIS W/O & W/DYE: CPT

## 2020-02-14 PROCEDURE — A9585 GADOBUTROL INJECTION: HCPCS | Performed by: UROLOGY

## 2020-02-14 RX ADMIN — GADOBUTROL 8 ML: 604.72 INJECTION INTRAVENOUS at 12:01

## 2020-02-21 ENCOUNTER — TELEPHONE (OUTPATIENT)
Dept: UROLOGY | Facility: MEDICAL CENTER | Age: 75
End: 2020-02-21

## 2020-02-21 NOTE — TELEPHONE ENCOUNTER
Call placed to patient and detailed message left on answering machine with recommendations made by Dr Courtney Michel  Office number provided for patient to call back with any questions or concerns  Office number provided

## 2020-02-21 NOTE — TELEPHONE ENCOUNTER
----- Message from Dulce Maria Wiley MD sent at 2/21/2020  3:05 PM EST -----  MRI shows a very low risk of clinically significant prostate cancer  We will continue with active surveillance  He should return in 1 year  Please inform the patient  Thank you

## 2020-03-13 ENCOUNTER — OFFICE VISIT (OUTPATIENT)
Dept: SLEEP CENTER | Facility: CLINIC | Age: 75
End: 2020-03-13
Payer: MEDICARE

## 2020-03-13 VITALS
SYSTOLIC BLOOD PRESSURE: 134 MMHG | BODY MASS INDEX: 27.09 KG/M2 | HEIGHT: 70 IN | WEIGHT: 189.2 LBS | DIASTOLIC BLOOD PRESSURE: 70 MMHG | HEART RATE: 70 BPM

## 2020-03-13 DIAGNOSIS — G47.19 EXCESSIVE DAYTIME SLEEPINESS: ICD-10-CM

## 2020-03-13 DIAGNOSIS — G25.81 RESTLESS LEG SYNDROME: ICD-10-CM

## 2020-03-13 DIAGNOSIS — F41.9 ANXIETY: ICD-10-CM

## 2020-03-13 DIAGNOSIS — E66.3 OVERWEIGHT (BMI 25.0-29.9): ICD-10-CM

## 2020-03-13 DIAGNOSIS — R68.2 DRY MOUTH: ICD-10-CM

## 2020-03-13 DIAGNOSIS — G47.33 OSA (OBSTRUCTIVE SLEEP APNEA): Primary | ICD-10-CM

## 2020-03-13 PROCEDURE — 99214 OFFICE O/P EST MOD 30 MIN: CPT | Performed by: INTERNAL MEDICINE

## 2020-03-13 RX ORDER — MIRTAZAPINE 15 MG/1
15 TABLET, FILM COATED ORAL DAILY
COMMUNITY
Start: 2020-03-03

## 2020-03-13 NOTE — PROGRESS NOTES
Review of Systems      Genitourinary need to urinate more than twice a night and none   Cardiology Frequent chest pain or angina,  and none   Gastrointestinal none   Neurology frequent headaches   Constitutional none   Integumentary none   Psychiatry anxiety   Musculoskeletal none   Pulmonary snoring   ENT none   Endocrine none   Hematological none

## 2020-03-13 NOTE — PROGRESS NOTES
Follow-Up Note - Spencer Chappell  69 y o  male  :1945  DJC:[id number]    CC: I saw this patient for follow-up in clinic today for his Sleep Disordered Breathing, Coexisting Sleep and Medical Problems  The patient had both diagnostic and therapeutic sleep studies [and is here to review results and to adjust therapy]  The diagnostic study confirmed moderate obstructive sleep apnea:  AHI 17/hour [considerably] [higher while supine]  [Intermittent] snoring [of moderate intensity] was noted  Minimum oxygen saturation 81 %  12 6 minutes [of total sleep time was spent with saturations less than 90%]  There were also severe periodic limb movements of sleep  During the subsequent therapeutic study, sleep disordered breathing was adequately remediated with PAP at 9 cm H2O  There were mild periodic limb movements of sleep  PFSH, Problem List, Medications & Allergies were reviewed in EMR  Interval changes: none reported  He  has a past medical history of BPH without obstruction/lower urinary tract symptoms, Elevated PSA, Erectile dysfunction, Hypercholesteremia, Hypertension, Malignant neoplasm prostate ([address] ), and Prostate nodule  He has a current medication list which includes the following prescription(s): ascorbic acid, calcium, mirtazapine, rosuvastatin, valsartan-hydrochlorothiazide, and sildenafil  ROS: A 10 point review of systems undertaken (as attached)  Significant for is not experiencing headaches or chest pain  Anxiety is controlled  [place] DATA REVIEWED:  using PAP > 4 hours/night 97% of the time  Estimated IMELDA 4 7/hour at pressure of 9cm H2O    SUBJECTIVE: Regarding use of PAP, Huey Shafer reports:   · He is experiencing minor adverse effects: dry mouth/throat  · He is   benefiting from use: no longer snoring / having breathing difficulties   · Restless leg symptoms occur infrequently    Sleep Routine: He reports getting 7 hrs sleep  ; he has no difficulty initiating or maintaining sleep   He awakens spontaneously and feels refreshed  He denied excessive drowsiness but would doze off if sedentary  He rated himself at Total score: 8 /24 on the [place] sleepiness scale  Habits: reports that he has never smoked  He has never used smokeless tobacco ,  reports that he drinks alcohol ,  reports that he does not use drugs  , Caffeine use: limited , Exercise routine: regular    OBJECTIVE: /70   Pulse 70   Ht 5' 10" (1 778 m)   Wt 85 8 kg (189 lb 3 2 oz)   BMI 27 15 kg/m²    Constitutional: Patient is well groomed; well appearing  Skin/Extrem: warm & dry; col & hydration normal; no edema  Psych: cooperativeand in no distress  Mental State appears normal   CNS: Alert, orientated, clear & coherent speech  H&N: EOMI; NC/AT:no facial pressure marks, no rashes  Neck Circumference: 16"  ENMT Mucus membranes normal Nasal airway:patent  Oral airway: crowded  Resp:effort is normal CVS: RRR ABD:truncal obesity MSK:Gait normal     ASSESSMENT: Primary Sleep/Secondary(to Medical or Psych conditions) & comorbidities   1  GABRIELLE (obstructive sleep apnea)  PAP DME Pressure Change    PAP DME Resupply/Reorder   2  Restless leg syndrome     3  Excessive daytime sleepiness     4  Dry mouth     5  Anxiety     6  Overweight (BMI 25 0-29  9)       PLAN:  1  Treatment with  PAP is medically necessary and Susan Arambula is agreable to continue use  2  Care of equipment, methods to improve comfort using PAP and importance of compliance with therapy were discussed  3  Pressure setting: change 10 cmH2O     4  Rx provided to replace supplies and Care coordinated with DME provider  5  Strategies for weight reduction were discussed  6  No medication is needed for restless leg symptoms/periodic limb movements of sleep  7  Follow-up is advised in 1 year or sooner if needed to monitor progress, compliance and to adjust therapy        Thank you for allowing me to participate in the care of this patient      Sincerely,    Authenticated electronically by Dara Rosario MD on 37/60/75   Board Certified Specialist

## 2020-03-16 ENCOUNTER — TELEPHONE (OUTPATIENT)
Dept: SLEEP CENTER | Facility: CLINIC | Age: 75
End: 2020-03-16

## 2021-02-05 ENCOUNTER — OFFICE VISIT (OUTPATIENT)
Dept: UROLOGY | Facility: MEDICAL CENTER | Age: 76
End: 2021-02-05
Payer: MEDICARE

## 2021-02-05 VITALS
BODY MASS INDEX: 26.63 KG/M2 | SYSTOLIC BLOOD PRESSURE: 120 MMHG | HEIGHT: 70 IN | HEART RATE: 69 BPM | DIASTOLIC BLOOD PRESSURE: 80 MMHG | WEIGHT: 186 LBS

## 2021-02-05 DIAGNOSIS — Z12.5 SPECIAL SCREENING FOR MALIGNANT NEOPLASM OF PROSTATE: ICD-10-CM

## 2021-02-05 DIAGNOSIS — C61 MALIGNANT NEOPLASM OF PROSTATE (HCC): Primary | ICD-10-CM

## 2021-02-05 LAB
SL AMB  POCT GLUCOSE, UA: NORMAL
SL AMB LEUKOCYTE ESTERASE,UA: NORMAL
SL AMB POCT BILIRUBIN,UA: NORMAL
SL AMB POCT BLOOD,UA: NORMAL
SL AMB POCT CLARITY,UA: CLEAR
SL AMB POCT COLOR,UA: YELLOW
SL AMB POCT KETONES,UA: NORMAL
SL AMB POCT NITRITE,UA: NORMAL
SL AMB POCT PH,UA: 6.5
SL AMB POCT SPECIFIC GRAVITY,UA: 1.02
SL AMB POCT URINE PROTEIN: NORMAL
SL AMB POCT UROBILINOGEN: 0.2

## 2021-02-05 PROCEDURE — 99214 OFFICE O/P EST MOD 30 MIN: CPT | Performed by: UROLOGY

## 2021-02-05 PROCEDURE — 81003 URINALYSIS AUTO W/O SCOPE: CPT | Performed by: UROLOGY

## 2021-02-05 NOTE — PROGRESS NOTES
Assessment/Plan:    Malignant neoplasm of prostate [facility])  The patient has been on active surveillance since November 2016 without evidence of progressive disease  Reassess in 1 year  Diagnoses and all orders for this visit:    Malignant neoplasm of prostate ([address] )  -     POCT urine dip auto non-scope    Special screening for malignant neoplasm of prostate  -     PSA, Total Screen; Future          Subjective:      Patient ID: Gerson Duvall is a 77 y o  male  HPI  Prostate cancer:   The patient had a PSA of 9 55 on August 24, 2016  A biopsy in November 2016 showed [place] 6 carcinoma in 1 core  The diagnosis was confirmed at [facility] patient's prostate measured 95 g at that time        Patient has been on active surveillance for approximately since November 2016  He has no signs or symptoms of advancing disease   His PSA in May 2017 was 9 79   In December 2017 it was 11 90 and PSA on December 19, 2018 was 12  6  MRI on February 14, 2020 was PIRADS 1 with a calculated prostate volume of 142 mL  PSA density is approximately 0 10  which is compatible with benign disease  He notes urinary frequency  He denies other significant urinary symptoms  He denies gross hematuria, urinary tract infections or incontinence  He is taking neither medications nor supplements for his symptoms  PSA:    14 20 on January 12, 2021  The patient's PSA is gradually increasing, but his doubling time is about 6-1/2 years  Or longe depending on the starting point selected  PSA density is 0 10  AUA SYMPTOM SCORE      Most Recent Value   AUA SYMPTOM SCORE   How often have you had a sensation of not emptying your bladder completely after you finished urinating? 1   How often have you had to urinate again less than two hours after you finished urinating?   2   How often have you found you stopped and started again several times when you urinate?  0   How often have you found it difficult to postpone urination? 1   How often have you had a weak urinary stream?  1   How often have you had to push or strain to begin urination? 0   How many times did you most typically get up to urinate from the time you went to bed at night until the time you got up in the morning?  0   Quality of Life: If you were to spend the rest of your life with your urinary condition just the way it is now, how would you feel about that?  1   AUA SYMPTOM SCORE  5        Erectile dysfunction:   Notes decreased desire and he is OK with this  Declined refill on sildenafil  The following portions of the patient's history were reviewed and updated as appropriate: allergies, current medications, past family history, past medical history, past social history, past surgical history and problem list     Review of Systems   Constitutional: Negative for activity change and fatigue  Respiratory: Negative for shortness of breath and wheezing  Cardiovascular: Negative for chest pain  Hypertension  Gastrointestinal: Negative for abdominal pain  Genitourinary: Negative for difficulty urinating, dysuria, frequency, hematuria and urgency  Musculoskeletal: Negative for back pain and gait problem  Skin: Negative  Allergic/Immunologic: Negative  Neurological: Negative  Taking mirtazapine for insomnia and anxiety  Psychiatric/Behavioral: Negative  Objective:      /80   Pulse 69   Ht 5' 10" (1 778 m)   Wt 84 4 kg (186 lb)   BMI 26 69 kg/m²          Physical Exam  Constitutional:       Appearance: He is well-developed  HENT:      Head: Normocephalic and atraumatic  Neck:      Musculoskeletal: Normal range of motion and neck supple  Pulmonary:      Effort: Pulmonary effort is normal    Genitourinary:     Rectum: Normal       Comments: The prostate is  firm, smooth, nontender  It was 142 gm by MRI in Feb 2020  Musculoskeletal: Normal range of motion     Skin:     General: Skin is warm and dry    Neurological:      Mental Status: He is alert and oriented to person, place, and time  Psychiatric:         Behavior: Behavior normal          Thought Content:  Thought content normal          Judgment: Judgment normal

## 2021-02-09 NOTE — ASSESSMENT & PLAN NOTE
The patient has been on active surveillance since November 2016 without evidence of progressive disease  Reassess in 1 year

## 2021-03-10 DIAGNOSIS — Z23 ENCOUNTER FOR IMMUNIZATION: ICD-10-CM

## 2021-03-19 ENCOUNTER — OFFICE VISIT (OUTPATIENT)
Dept: SLEEP CENTER | Facility: CLINIC | Age: 76
End: 2021-03-19
Payer: MEDICARE

## 2021-03-19 VITALS
SYSTOLIC BLOOD PRESSURE: 124 MMHG | HEIGHT: 70 IN | BODY MASS INDEX: 26.74 KG/M2 | DIASTOLIC BLOOD PRESSURE: 76 MMHG | HEART RATE: 68 BPM | WEIGHT: 186.8 LBS

## 2021-03-19 DIAGNOSIS — G47.33 OSA (OBSTRUCTIVE SLEEP APNEA): Primary | ICD-10-CM

## 2021-03-19 DIAGNOSIS — G47.19 EXCESSIVE DAYTIME SLEEPINESS: ICD-10-CM

## 2021-03-19 DIAGNOSIS — E66.3 OVERWEIGHT (BMI 25.0-29.9): ICD-10-CM

## 2021-03-19 DIAGNOSIS — G25.81 RESTLESS LEG SYNDROME: ICD-10-CM

## 2021-03-19 DIAGNOSIS — R68.2 DRY MOUTH: ICD-10-CM

## 2021-03-19 DIAGNOSIS — F41.9 ANXIETY: ICD-10-CM

## 2021-03-19 PROCEDURE — 99214 OFFICE O/P EST MOD 30 MIN: CPT | Performed by: INTERNAL MEDICINE

## 2021-03-19 NOTE — PATIENT INSTRUCTIONS

## 2021-03-19 NOTE — PROGRESS NOTES
Review of Systems      Genitourinary none   Cardiology none   Gastrointestinal none   Neurology need to move extremities   Constitutional none   Integumentary none   Psychiatry none   Musculoskeletal none   Pulmonary none   ENT none   Endocrine none   Hematological none

## 2021-03-19 NOTE — PROGRESS NOTES
Follow-Up Note - Spencer Chappell  77 y o  male  :1945  KGY:[id number]    CC: I saw this patient for follow-up in clinic today for Sleep disordered breathing, Coexisting Sleep and Medical Problems  Results of prior studies in 2019: The diagnostic study confirmed moderate obstructive sleep apnea:  AHI 17/hour considerably higher while supine  Intermittent snoring of moderate intensity was noted  Minimum oxygen saturation 81 %  12 6 minutes of total sleep time was spent with saturations less than 90%  There were also severe periodic limb movements of sleep  During the subsequent therapeutic study, sleep disordered breathing was adequately remediated with PAP at 9 cm H2O  There were mild periodic limb movements of sleep    PFSH, Problem List, Medications & Allergies were reviewed in EMR  Interval changes: none reported  He  has a past medical history of BPH without obstruction/lower urinary tract symptoms, Elevated PSA, Erectile dysfunction, Hypercholesteremia, Hypertension, Malignant neoplasm prostate ([facility] [address] ), and Prostate nodule  He has a current medication list which includes the following prescription(s): ascorbic acid, calcium, mirtazapine, rosuvastatin, sildenafil, and valsartan-hydrochlorothiazide  ROS: as attached  Significant for weight has been stable  He has feelings of anxiety and still needs to continue mirtazapine  PHYSIOLOGICAL DATA REVIEW AND INTERPRETATION:   using PAP > 4 hours/night 3 3%  Estimated IMELDA 2 2/hour with pressure of 10 5cm H2O @90th percentile  He is using almost every night but average use is just over 2 hours  Compliance: fair; Sleep disordered breathing:stable & within target range    SUBJECTIVE: Regarding use of PAP, Yvette Silva reports:   · He is experiencing some adverse effects: dry mouth/throat, removes mask unknowingly and abdominal bloating or discomfort; he has to burp several times initially when he applies CPAP    He takes mirtazapine immediately before bedtime  He is benefiting from use: sleeping better   · He reports mild restless leg symptoms but is not dealing sleep and is not aware of jerking movements during sleep  Sleep Routine: He reports getting 8 hrs sleep  ; he no difficulty initiating or maintaining sleep   He awakens spontaneously and feels refreshed  Cele Cast reports episodic  Excessive Daytime Sleepiness and may nap occasionally when he has nothing else to do  He rated himself at Total score: 5 /24 on the Sanford Sleepiness Scale  Habits: reports that he has never smoked  He has never used smokeless tobacco ,  reports current alcohol use ,  reports no history of drug use , Caffeine use: limited , Exercise routine: regular    EXAM: /76   Pulse 68   Ht 5' 10" (1 778 m)   Wt 84 7 kg (186 lb 12 8 oz)   BMI 26 80 kg/m²     Patient is well groomed; well appearing  Skin/Extrem: col & hydration normal; no edema  Psych: cooperativeand in no distress  Mental state:appears normal   CNS: Alert, orientated, clear & coherent speech  H&N: EOMI; NC/AT:no facial pressure marks, no rashes  Physical findings otherwise essentially unchanged from previous  IMPRESSION: Problem List Items & Comorbidities Addressed this Visit    1  GABRIELLE (obstructive sleep apnea)  PAP DME Pressure Change    PAP DME Resupply/Reorder   2  Dry mouth     3  Restless leg syndrome     4  Excessive daytime sleepiness     5  Anxiety     6  Overweight (BMI 25 0-29  9)         PLAN:  1  I reviewed results of prior studies and physiologic data with the patient  I discussed treatment options with risks and benefits  2  Treatment with  PAP is medically necessary and Cele Cast is agreable to continue use  3  Care of equipment, methods to improve comfort using PAP and importance of compliance with therapy were discussed  4  Pressure setting: continue 9-11 cmH2O  Increase pressure relief to level 3  5   Rx provided to replace supplies and Care coordinated with DME provider  6  No medication is needed for the restless leg symptoms  7  I suggested taking the mirtazapine an hour or 2 prior to bedtime and elevating head of the bed  Also advise considering lowering the dose  8  Discussed  strategies for weight maintenance/reduction  9  Follow-up is advised in 1 year or sooner if needed to monitor progress, compliance and to adjust therapy  Thank you for allowing me to participate in the care of this patient      Sincerely,    Authenticated electronically by Joe Deluna MD on 47/48/35   Board Certified Specialist

## 2021-03-22 ENCOUNTER — TELEPHONE (OUTPATIENT)
Dept: SLEEP CENTER | Facility: CLINIC | Age: 76
End: 2021-03-22

## 2021-05-21 PROBLEM — F41.9 ANXIETY: Status: ACTIVE | Noted: 2021-05-21

## 2021-05-21 PROBLEM — C61: Status: ACTIVE | Noted: 2017-12-27

## 2021-07-22 ENCOUNTER — TELEPHONE (OUTPATIENT)
Dept: OTHER | Facility: OTHER | Age: 76
End: 2021-07-22

## 2021-08-04 ENCOUNTER — TELEPHONE (OUTPATIENT)
Dept: OTHER | Facility: OTHER | Age: 76
End: 2021-08-04

## 2021-08-04 NOTE — TELEPHONE ENCOUNTER
Patient would like an appointment next with Dr Susan Montana for blood in his semen  I attempted to schedule but there was nothing I could see for next week

## 2021-08-04 NOTE — TELEPHONE ENCOUNTER
Pt given appt tomorrow morning to see Dr Walter Marte to discuss red blood in semen which he states he has been experiencing for about 6 weeks  He states he has had this before  And has no other symptoms at this time

## 2021-08-05 ENCOUNTER — OFFICE VISIT (OUTPATIENT)
Dept: UROLOGY | Facility: MEDICAL CENTER | Age: 76
End: 2021-08-05
Payer: MEDICARE

## 2021-08-05 VITALS
DIASTOLIC BLOOD PRESSURE: 78 MMHG | HEIGHT: 70 IN | BODY MASS INDEX: 26.77 KG/M2 | SYSTOLIC BLOOD PRESSURE: 140 MMHG | HEART RATE: 67 BPM | WEIGHT: 187 LBS

## 2021-08-05 DIAGNOSIS — C61 MALIGNANT NEOPLASM OF PROSTATE (HCC): ICD-10-CM

## 2021-08-05 DIAGNOSIS — R36.1 HEMATOSPERMIA: Primary | ICD-10-CM

## 2021-08-05 LAB
SL AMB  POCT GLUCOSE, UA: ABNORMAL
SL AMB LEUKOCYTE ESTERASE,UA: ABNORMAL
SL AMB POCT BILIRUBIN,UA: ABNORMAL
SL AMB POCT BLOOD,UA: ABNORMAL
SL AMB POCT CLARITY,UA: CLEAR
SL AMB POCT COLOR,UA: YELLOW
SL AMB POCT KETONES,UA: ABNORMAL
SL AMB POCT NITRITE,UA: ABNORMAL
SL AMB POCT PH,UA: 7
SL AMB POCT SPECIFIC GRAVITY,UA: 1.02
SL AMB POCT URINE PROTEIN: ABNORMAL
SL AMB POCT UROBILINOGEN: 0.2

## 2021-08-05 PROCEDURE — 81003 URINALYSIS AUTO W/O SCOPE: CPT | Performed by: UROLOGY

## 2021-08-05 PROCEDURE — 99214 OFFICE O/P EST MOD 30 MIN: CPT | Performed by: UROLOGY

## 2021-08-05 NOTE — ASSESSMENT & PLAN NOTE
This appears to be benign in nature  While it may be a sign of prostate cancer, the patient has been followed for years and is on active surveillance  This bleeding episode is not a reason to change his prostate cancer management

## 2021-08-05 NOTE — PROGRESS NOTES
Assessment/Plan:    Hematospermia  This appears to be benign in nature  While it may be a sign of prostate cancer, the patient has been followed for years and is on active surveillance  This bleeding episode is not a reason to change his prostate cancer management  Adenocarcinoma of prostate, stage 1 ([facility] [address] )  The patient has been on active surveillance since 2016  Continue the program of active surveillance as previously scheduled  Diagnoses and all orders for this visit:    Hematospermia    Malignant neoplasm of prostate ([facility] [address] )  -     POCT urine dip auto non-scope          Subjective:      Patient ID: Hyacinth Banks is a 77 y o  male  HPI  Hematospermia:   Noted blood in semen about 6-8 weeks ago  That was the first time he had sex in a year  Pt has had this in the past   No discomfort  No urinary discomfort  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  Prostate cancer on active surveillance:   The patient had a PSA of 9 55 on August 24, 2016   A biopsy in November 2016 showed Randolph 6 carcinoma in 1 core  The diagnosis was confirmed at [facility]    The patient's prostate measured 95 g at that time        Patient has been on active surveillance since November 2016  He has no signs or symptoms of advancing disease   His PSA in May 2017 was 9 79   In December 2017 it was 11 90 and PSA on December 19, 2018 was 12  6      MRI on February 14, 2020 was PIRADS 1 with a calculated prostate volume of 142 mL  PSA density is approximately 0 10  which is compatible with benign disease  He notes urinary frequency  He denies other significant urinary symptoms  He denies gross hematuria, urinary tract infections or incontinence  He is taking neither medications nor supplements for his symptoms  PSA:  14 20 on January 12, 2021  The patient's PSA is gradually increasing, but his doubling time is about 6-1/2 years  Or longer depending on the starting point selected    PSA density is 0 10  AUA SYMPTOM SCORE      Most Recent Value   AUA SYMPTOM SCORE   How often have you had a sensation of not emptying your bladder completely after you finished urinating? 1   How often have you had to urinate again less than two hours after you finished urinating? 2   How often have you found you stopped and started again several times when you urinate? 1   How often have you found it difficult to postpone urination? 0   How often have you had a weak urinary stream?  1   How often have you had to push or strain to begin urination? 0   How many times did you most typically get up to urinate from the time you went to bed at night until the time you got up in the morning?  0   Quality of Life: If you were to spend the rest of your life with your urinary condition just the way it is now, how would you feel about that?  2   AUA SYMPTOM SCORE  5          The following portions of the patient's history were reviewed and updated as appropriate: allergies, current medications, past family history, past medical history, past social history, past surgical history and problem list     Review of Systems    Constitutional: Negative for activity change and fatigue  Respiratory: Negative for shortness of breath and wheezing  Cardiovascular: Negative for chest pain  Hypertension  Gastrointestinal: Negative for abdominal pain  Genitourinary: Negative for difficulty urinating, dysuria, frequency, hematuria and urgency  Musculoskeletal: Negative for back pain and gait problem  Skin: Negative  Allergic/Immunologic: Negative  Neurological: Negative  Taking mirtazapine for insomnia and anxiety  Psychiatric/Behavioral: Negative  Objective:      /78   Pulse 67   Ht 5' 10" (1 778 m)   Wt 84 8 kg (187 lb)   BMI 26 83 kg/m²          Physical Exam  Constitutional:       Appearance: He is well-developed  HENT:      Head: Normocephalic and atraumatic     Pulmonary: Effort: Pulmonary effort is normal    Genitourinary:     Comments: The prostate is  firm, smooth, nontender  It was 142 gm by MRI in Feb 2020  Granted that most of the prostate is not palpable, there was no tenderness or edema to suggest prostatitis  Musculoskeletal:         General: Normal range of motion  Cervical back: Normal range of motion  Skin:     General: Skin is warm and dry  Neurological:      Mental Status: He is alert and oriented to person, place, and time  Psychiatric:         Behavior: Behavior normal          Thought Content:  Thought content normal          Judgment: Judgment normal

## 2021-08-05 NOTE — ASSESSMENT & PLAN NOTE
The patient has been on active surveillance since 2016  Continue the program of active surveillance as previously scheduled

## 2022-01-14 ENCOUNTER — TELEPHONE (OUTPATIENT)
Dept: UROLOGY | Facility: MEDICAL CENTER | Age: 77
End: 2022-01-14

## 2022-01-14 NOTE — TELEPHONE ENCOUNTER
Call taken from voice mail patient asking do I need a psa for my appt with Dr Jose D Meza? Returned call to patient and advised that he does need blood work and will will mail it to him  Patient uses [facility]

## 2022-02-01 ENCOUNTER — TELEPHONE (OUTPATIENT)
Dept: SLEEP CENTER | Facility: CLINIC | Age: 77
End: 2022-02-01

## 2022-02-01 NOTE — TELEPHONE ENCOUNTER
Patient called asking about the [employer] CPAP recall  States he got an email that his machine is affected  He registered with [employer]  He has never used an ozone   Advised as below:  Continuous Positive Airway Pressure (CPAP) therapy was prescribed to you as a medical necessity and there are risks of discontinuing  use of the device, some of which may be long term  Symptoms you experienced before using CPAP may return such as snoring, apneas, excessive daytime sleepiness, hypertension, cardiac arrhythmias, risk of stroke, congestive heart-failure, exacerbation of COPD and potential respiratory failure  Ultimately, it is a personal decision for you to make if you continue use of an affected device or discontinue until a replacement is provided  Unfortunately, [employer] has not yet provided us with information about available devices  Another option would be to check with your medical equipment provider to determine if you are eligible for a new machine through your insurance, if not you can pay out of pocket for a new machine  We are able to provide you with a script, please include your mask type  Patient states he has a follow up with Dr Thony Jurado on 3/22/22 and he will discuss it further with the physician at that time

## 2022-02-03 ENCOUNTER — OFFICE VISIT (OUTPATIENT)
Dept: UROLOGY | Facility: MEDICAL CENTER | Age: 77
End: 2022-02-03
Payer: MEDICARE

## 2022-02-03 VITALS
SYSTOLIC BLOOD PRESSURE: 130 MMHG | DIASTOLIC BLOOD PRESSURE: 70 MMHG | BODY MASS INDEX: 26.63 KG/M2 | HEIGHT: 70 IN | WEIGHT: 186 LBS

## 2022-02-03 DIAGNOSIS — C61 MALIGNANT NEOPLASM OF PROSTATE (HCC): Primary | ICD-10-CM

## 2022-02-03 DIAGNOSIS — R97.20 ELEVATED PSA: ICD-10-CM

## 2022-02-03 PROCEDURE — 99214 OFFICE O/P EST MOD 30 MIN: CPT | Performed by: UROLOGY

## 2022-02-03 NOTE — PROGRESS NOTES
Assessment/Plan:    Malignant neoplasm of prostate [facility])  The patient remains on active surveillance  AUA symptom score is 7  He is pleased with his voiding pattern  Digital rectal examination is benign in nature  Most recent PSA on January 25, 2022 was improved at 13 10  Last year his PSA was 14 2  It is been 2 years since he had a multiparametric MRI and I recommended repeating one  at this time  If all is well we will continue yearly PSA testing  Elevated PSA  As above  Diagnoses and all orders for this visit:    Malignant neoplasm of prostate ([facility] [address] )  -     MRI prostate multiparametric wo w contrast; Future  -     PSA Total, Diagnostic; Future    Elevated PSA          Subjective:      Patient ID: Fernando Bright is a 77 y o  male  Chief complaint:  Prostate cancer (active surveillance)    HPI:  72-year-old male followed for the above complaints  He has been followed with active surveillance since 2016  At that time he had a biopsy that revealed 1 core of Tamar 3+3=6 prostate cancer  He has been followed on active surveillance since that time  He has not had a confirmatory biopsy  He had an MRI in 2020 which was PIRADS 1  His prostate is very large on MRI  He notes he is voiding well  His stream is adequate  He feels he empties his bladder well  There is no gross hematuria, dysuria or symptoms of infection  He has no new back or bone pain and there are no constitutional symptoms  He returns today for follow-up  The following portions of the patient's history were reviewed and updated as appropriate: allergies, current medications, past family history, past medical history, past social history, past surgical history and problem list     Review of Systems   Constitutional: Negative for chills, diaphoresis, fatigue and fever  HENT: Negative  Eyes: Negative  Respiratory: Negative  Cardiovascular: Negative  Gastrointestinal: Negative  Endocrine: Negative  Genitourinary:        See HPI   Musculoskeletal: Positive for arthralgias  Skin: Negative  Allergic/Immunologic: Negative  Neurological: Negative  Hematological: Negative  Psychiatric/Behavioral: Negative  AUA SYMPTOM SCORE      Most Recent Value   AUA SYMPTOM SCORE    How often have you had a sensation of not emptying your bladder completely after you finished urinating? 1   How often have you had to urinate again less than two hours after you finished urinating? 2   How often have you found you stopped and started again several times when you urinate? 1   How often have you found it difficult to postpone urination? 1   How often have you had a weak urinary stream? 2   How often have you had to push or strain to begin urination? 0   How many times did you most typically get up to urinate from the time you went to bed at night until the time you got up in the morning? 0   Quality of Life: If you were to spend the rest of your life with your urinary condition just the way it is now, how would you feel about that? 1   AUA SYMPTOM SCORE 7        Objective:      /70   Ht 5' 10" (1 778 m)   Wt 84 4 kg (186 lb)   BMI 26 69 kg/m²          Physical Exam  Vitals reviewed  Constitutional:       General: He is not in acute distress  Appearance: Normal appearance  He is well-developed and normal weight  He is not ill-appearing, toxic-appearing or diaphoretic  HENT:      Head: Normocephalic and atraumatic  Eyes:      General: No scleral icterus  Conjunctiva/sclera: Conjunctivae normal    Cardiovascular:      Rate and Rhythm: Normal rate  Pulmonary:      Effort: Pulmonary effort is normal    Abdominal:      General: Bowel sounds are normal  There is no distension  Palpations: Abdomen is soft  There is no mass  Tenderness: There is no abdominal tenderness  There is no right CVA tenderness, left CVA tenderness, guarding or rebound  Hernia: No hernia is present  Genitourinary:     Penis: Normal  No phimosis or hypospadias  Testes: Normal          Right: Mass not present  Left: Mass not present  Rectum: Normal       Comments: Prostate enlarged and palpably benign  No nodule or induration is  appreciated  Musculoskeletal:         General: Normal range of motion  Cervical back: Normal range of motion and neck supple  Skin:     General: Skin is warm and dry  Neurological:      General: No focal deficit present  Mental Status: He is alert and oriented to person, place, and time  Psychiatric:         Mood and Affect: Mood normal          Behavior: Behavior normal          Thought Content:  Thought content normal          Judgment: Judgment normal              Component 01/25/22 01/12/21 01/15/20 12/19/18 05/16/18 12/06/17   PSA, Total 13 10 High  14 20 High   13 30 High  12 60 High   11 10 High   11 90 High

## 2022-02-03 NOTE — PATIENT INSTRUCTIONS
Prostate Cancer, Ambulatory Care   GENERAL INFORMATION:   Prostate cancer, Ambulatory Care develops in the male sex gland that helps make semen (prostate)  It is about the size of a walnut and wraps around the urethra  The urethra is the tube that carries urine from the bladder to the end of the penis  In most cases, prostate cancer is slow growing  Common symptoms include the following:   · Trouble starting or stopping the flow of urine    · Feeling the need to urinate often, especially at night    · Pain or a burning feeling when you urinate or ejaculate semen    · Trouble having an erection    · Blood in your urine or semen    · Not being able to urinate at all    · Pain or stiffness in your lower back, hips, or upper thighs  Seek immediate care for the following symptoms:   · Chest pain when you take a deep breath or cough    · Coughing up blood    · Suddenly feeling lightheaded and short of breath    · Your leg feels warm, tender, and painful  It may look swollen and red  Treatment for prostate cancer: If you have early stage cancer, your healthcare provider may recommend that you have frequent tests and regular follow-up visits to watch for changes  You may also need any of the following:  · Hormone therapy  is medicine used to decrease testosterone (male hormone) levels  · Radiation therapy  is used to kill cancer cells with high-energy x-rays beams  You may receive radiation therapy from outside your body or from small beads or rods placed inside your prostate  · Surgery  may be needed, depending on the stage of the cancer  Part or all of your prostate may be removed  You may also need to have some lymph nodes taken out  This may help keep the cancer from spreading to other parts of your body  Manage your prostate cancer:   · Eat a variety of healthy foods  Healthy foods include fruits, vegetables, whole-grain breads, low-fat dairy products, beans, lean meats, and fish   Your healthcare provider may also recommend changes to the amounts of calcium and vitamin D you have each day  · Manage your weight  Obesity may increase your risk for problems from prostate cancer  Limit or do not have high-calorie foods or drinks  · Exercise as directed  Exercise may help you recover after treatment and may help prevent your prostate cancer from returning  Exercise can also help you manage your weight  Try to get at least 30 minutes of exercise 5 days a week, such as walking  · Do not smoke  If you smoke, it is never too late to quit  Smoking increases the risk that your prostate cancer will return after treatment  Smoking also increases your risk for other types of cancer  Ask your healthcare provider for information if you need help quitting  · Limit or do not drink alcohol  If you drink, limit alcohol to 2 drinks per day  A drink is 12 ounces of beer, 1½ ounces of liquor, or 5 ounces of wine  Follow up with your urologist or oncologist as directed:  Write down your questions so you remember to ask them during your visits  CARE AGREEMENT:   You have the right to help plan your care  Learn about your health condition and how it may be treated  Discuss treatment options with your caregivers to decide what care you want to receive  You always have the right to refuse treatment  The above information is an  only  It is not intended as medical advice for individual conditions or treatments  Talk to your doctor, nurse or pharmacist before following any medical regimen to see if it is safe and effective for you  © 2014 [address] is for End User's use only and may not be sold, redistributed or otherwise used for commercial purposes  All illustrations and images included in CareNotes® are the copyrighted property of A D A M [employer] Inc  or [employer]

## 2022-02-03 NOTE — ASSESSMENT & PLAN NOTE
The patient remains on active surveillance  AUA symptom score is 7  He is pleased with his voiding pattern  Digital rectal examination is benign in nature  Most recent PSA on January 25, 2022 was improved at 13 10  Last year his PSA was 14 2  It is been 2 years since he had a multiparametric MRI and I recommended repeating one  at this time  If all is well we will continue yearly PSA testing

## 2022-03-10 ENCOUNTER — HOSPITAL ENCOUNTER (OUTPATIENT)
Dept: RADIOLOGY | Age: 77
Discharge: HOME | End: 2022-03-10
Payer: MEDICARE

## 2022-03-10 DIAGNOSIS — C61 MALIGNANT NEOPLASM OF PROSTATE (HCC): ICD-10-CM

## 2022-03-10 PROCEDURE — 76377 3D RENDER W/INTRP POSTPROCES: CPT

## 2022-03-10 PROCEDURE — A9585 GADOBUTROL INJECTION: HCPCS | Performed by: UROLOGY

## 2022-03-10 PROCEDURE — 72197 MRI PELVIS W/O & W/DYE: CPT

## 2022-03-10 PROCEDURE — G1004 CDSM NDSC: HCPCS

## 2022-03-10 RX ADMIN — GADOBUTROL 8 ML: 604.72 INJECTION INTRAVENOUS at 14:41

## 2022-03-22 ENCOUNTER — OFFICE VISIT (OUTPATIENT)
Dept: SLEEP CENTER | Facility: CLINIC | Age: 77
End: 2022-03-22
Payer: MEDICARE

## 2022-03-22 VITALS
DIASTOLIC BLOOD PRESSURE: 67 MMHG | SYSTOLIC BLOOD PRESSURE: 138 MMHG | BODY MASS INDEX: 27.49 KG/M2 | WEIGHT: 192 LBS | HEART RATE: 75 BPM | HEIGHT: 70 IN

## 2022-03-22 DIAGNOSIS — G25.81 RESTLESS LEG SYNDROME: ICD-10-CM

## 2022-03-22 DIAGNOSIS — F41.9 ANXIETY: ICD-10-CM

## 2022-03-22 DIAGNOSIS — R68.2 DRY MOUTH: ICD-10-CM

## 2022-03-22 DIAGNOSIS — G47.33 OSA (OBSTRUCTIVE SLEEP APNEA): Primary | ICD-10-CM

## 2022-03-22 DIAGNOSIS — E66.3 OVERWEIGHT (BMI 25.0-29.9): ICD-10-CM

## 2022-03-22 DIAGNOSIS — Z71.89 CPAP USE COUNSELING: ICD-10-CM

## 2022-03-22 PROCEDURE — 99214 OFFICE O/P EST MOD 30 MIN: CPT | Performed by: INTERNAL MEDICINE

## 2022-03-22 NOTE — PROGRESS NOTES
Follow-Up Note - Spencer Chappell  77 y o  male  :1945  TMW:[id number]  DOS:3/22/2022    CC: I saw this patient for follow-up in clinic today for Sleep disordered breathing, Coexisting Sleep and Medical Problems  Is using a dream Station 1 machine that he received in  and has registered with Rob Garces  Results of prior studies in 2019: The diagnostic study confirmed moderate obstructive sleep apnea:  AHI 17/hour considerably higher while supine  Intermittent snoring of moderate intensity was noted  Minimum oxygen saturation 81 %  12 6 minutes of total sleep time was spent with saturations less than 90%  There were also severe periodic limb movements of sleep  During the subsequent therapeutic study, sleep disordered breathing was adequately remediated with PAP at 9 cm H2O  There were mild periodic limb movements of sleep     PFSH, Problem List, Medications & Allergies were reviewed in EMR  Interval changes: none reported  He  has a past medical history of Adenocarcinoma of prostate, stage 1 ([address] ) (2017), Anxiety (2021), BPH without obstruction/lower urinary tract symptoms, Elevated PSA, Erectile dysfunction, Hypercholesteremia, Hypertension, Malignant neoplasm prostate ([address] ), GABRIELLE (obstructive sleep apnea), and Prostate nodule  He has a current medication list which includes the following prescription(s): ascorbic acid, calcium, mirtazapine, rosuvastatin, sildenafil, and valsartan-hydrochlorothiazide  PHYSIOLOGICAL DATA REVIEW AND INTERPRETATION:    using PAP > 4 hours/night 53%  Estimated IMELDA 3 2/hour with pressure of 10 9 cm H2O @90th percentile]; Patient has not been using ozone based devices to sanitize the machine  SUBJECTIVE: Regarding use of PAP, Cleve Gutiérrez reports:   · He is experiencing minimal adverse effects: dry mouth/throat and removes mask unknowingly; has not noticed any fibres or foreign material in air line       · He is benefiting from use: sleeping better   · Restless leg symptoms occur occasionally and not disturbing sleep  Sleep Routine: Jacinda Rodriguez reports getting 8 hrs sleep  ; he has no difficulty initiating or maintaining sleep   He arises spontaneously and feels refreshed  Jacinda Rodriguez denies Excessive Daytime Sleepiness,   He rated himself at Total score: 5 /24 on the [place] Sleepiness Scale  Habits: reports that he has never smoked  He has never used smokeless tobacco ,  reports current alcohol use ,  reports no history of drug use , Caffeine use:limited ; Exercise routine: regular    ROS: reviewed & as attached  Significant for weight has been stable  He reports no nasal, respiratory or cardiac symptoms  Mood is stable on current medication  EXAM: /67 (BP Location: Left arm, Patient Position: Sitting, Cuff Size: Large)   Pulse 75   Ht 5' 10" (1 778 m)   Wt 87 1 kg (192 lb)   BMI 27 55 kg/m²     Patient is well groomed; well appearing  CNS: Alert, orientated, clear & coherent speech  Psych: cooperativeand in no distress  Mental state:appears normal   H&N: EOMI; NC/AT:no facial pressure marks, no rashes  Skin/Extrem: col & hydration normal; no edema  Resp: Respiratory effort is normal  Physical findings otherwise essentially unchanged from previous  IMPRESSION: Problem List Items & Comorbidities Addressed this Visit    1  GABRILELE (obstructive sleep apnea)  PAP DME Resupply/Reorder   2  CPAP use counseling     3  Restless leg syndrome     4  Dry mouth     5  Anxiety     6  Overweight (BMI 25 0-29  9)         PLAN:  I reviewed results of prior studies and physiologic data with the patient  Notified of Dru devices recall and their recommendation to discontinue use  Risks of discontinuing PAP vs continuing while awaiting resolution were explained and patient indicates understanding  I discussed treatment options with risks and benefits  Patient elected to  continue using Pap while awaiting remediation      Instructed to register machine with Dru & track progress on Stefani Baker  Care of equipment, methods to improve comfort using PAP and importance of compliance with therapy were discussed  Instructed not to use ozone based or other unapproved  cleaning devices  Pressure settin-11 cmH2O  Rx provided to replace  supplies and Care coordinated with DME provider  No medication is needed for the restless leg symptoms  Discussed strategies for weight maintenance  Follow-up is advised in 1 year or sooner if needed to monitor progress, compliance and to adjust therapy  Thank you for allowing me to participate in the care of this patient  Sincerely,    Authenticated electronically by Tigist Lindquist MD on 08   Board Certified Specialist     Portions of the record may have been created with voice recognition software  Occasional wrong word or "sound a like" substitutions may have occurred due to the inherent limitations of voice recognition software  There may also be notations and random deletions of words or characters from malfunctioning software  Read the chart carefully and recognize, using context, where substitutions/deletions have occurred

## 2022-03-23 ENCOUNTER — TELEPHONE (OUTPATIENT)
Dept: SLEEP CENTER | Facility: CLINIC | Age: 77
End: 2022-03-23

## 2022-11-17 RX ORDER — ALPRAZOLAM 0.5 MG/1
TABLET ORAL 2 TIMES DAILY PRN
COMMUNITY
Start: 2022-09-08

## 2022-11-17 RX ORDER — BUSPIRONE HYDROCHLORIDE 5 MG/1
5 TABLET ORAL 2 TIMES DAILY
COMMUNITY
Start: 2022-09-30 | End: 2022-11-22 | Stop reason: ALTCHOICE

## 2022-11-22 ENCOUNTER — OFFICE VISIT (OUTPATIENT)
Dept: UROLOGY | Facility: MEDICAL CENTER | Age: 77
End: 2022-11-22

## 2022-11-22 VITALS
WEIGHT: 188 LBS | DIASTOLIC BLOOD PRESSURE: 80 MMHG | BODY MASS INDEX: 26.92 KG/M2 | SYSTOLIC BLOOD PRESSURE: 130 MMHG | HEART RATE: 81 BPM | HEIGHT: 70 IN

## 2022-11-22 DIAGNOSIS — R97.20 ELEVATED PSA: ICD-10-CM

## 2022-11-22 DIAGNOSIS — R36.1 HEMATOSPERMIA: ICD-10-CM

## 2022-11-22 DIAGNOSIS — C61 MALIGNANT NEOPLASM OF PROSTATE (HCC): Primary | ICD-10-CM

## 2022-11-22 LAB
SL AMB  POCT GLUCOSE, UA: NORMAL
SL AMB LEUKOCYTE ESTERASE,UA: NORMAL
SL AMB POCT BILIRUBIN,UA: NORMAL
SL AMB POCT BLOOD,UA: NORMAL
SL AMB POCT CLARITY,UA: CLEAR
SL AMB POCT COLOR,UA: YELLOW
SL AMB POCT KETONES,UA: NORMAL
SL AMB POCT NITRITE,UA: NORMAL
SL AMB POCT PH,UA: 7
SL AMB POCT SPECIFIC GRAVITY,UA: 1.02
SL AMB POCT URINE PROTEIN: NORMAL
SL AMB POCT UROBILINOGEN: 1

## 2022-11-22 NOTE — ASSESSMENT & PLAN NOTE
PSA has risen to 25 4 on active surveillance  His most recent multiparametric MRI in March 2022 revealed a very large prostate (154 g) and low risk of significant prostate cancer  We had a long discussion regarding the PSA rise  I did recommend we recheck his PSA in the next few weeks  He has not had a recent prostate biopsy and I did recommend that we plan for a transperineal biopsy to ensure he is not progressing on active surveillance  The procedure was described  Risks were discussed

## 2022-11-22 NOTE — PROGRESS NOTES
Assessment/Plan:    Elevated PSA  PSA has risen to 25 4 on active surveillance  His most recent multiparametric MRI in March 2022 revealed a very large prostate (154 g) and low risk of significant prostate cancer  We had a long discussion regarding the PSA rise  I did recommend we recheck his PSA in the next few weeks  He has not had a recent prostate biopsy and I did recommend that we plan for a transperineal biopsy to ensure he is not progressing on active surveillance  The procedure was described  Risks were discussed  Malignant neoplasm of prostate [facility])  The patient has been on active surveillance since 2016  Diagnoses and all orders for this visit:    Malignant neoplasm of prostate ([facility] [address] )  -     POCT urine dip auto non-scope  -     Case request operating room: TRANSPERINEAL ULTRASOUND GUIDED BIOPSY PROSTATE; Standing  -     EKG 12 lead; Future  -     Case request operating room: TRANSPERINEAL ULTRASOUND GUIDED BIOPSY PROSTATE    Elevated PSA  -     PSA, total and free; Future  -     Case request operating room: TRANSPERINEAL ULTRASOUND GUIDED BIOPSY PROSTATE; Standing  -     EKG 12 lead; Future  -     Case request operating room: TRANSPERINEAL ULTRASOUND GUIDED BIOPSY PROSTATE    Hematospermia    Other orders  -     Diet NPO; Sips with meds; Standing  -     Place sequential compression device; Standing          Subjective:      Patient ID: Angi Justin is a 69 y o  male  Chief complaint:  Prostate Cancer (active surveillance)    HPI:  23-year-old male followed for the above complaints  He notes he is generally voiding well  His stream is slow and he has hesitancy at times in the morning, however he remains satisfied his voiding pattern  His stream is adequate and he feels he empties his bladder  He gets up once a night to urinate  He denies gross hematuria, dysuria or symptoms of infection  There is no new back or bone pain  He has no constitutional symptoms        The following portions of the patient's history were reviewed and updated as appropriate: allergies, current medications, past family history, past medical history, past social history, past surgical history and problem list     Review of Systems   Constitutional: Negative for chills, diaphoresis, fatigue and fever  HENT: Negative  Eyes: Negative  Respiratory: Negative  Cardiovascular: Negative  Gastrointestinal: Negative  Endocrine: Negative  Genitourinary:        See HPI   Musculoskeletal: Negative  Leg pain with lifting   Skin: Negative  Allergic/Immunologic: Negative  Neurological: Negative  Hematological: Negative  Psychiatric/Behavioral: Negative  AUA SYMPTOM SCORE    Flowsheet Row Most Recent Value   AUA SYMPTOM SCORE    How often have you had a sensation of not emptying your bladder completely after you finished urinating? 1 (P)     How often have you had to urinate again less than two hours after you finished urinating? 2 (P)     How often have you found you stopped and started again several times when you urinate? 2 (P)     How often have you found it difficult to postpone urination? 1 (P)     How often have you had a weak urinary stream? 1 (P)     How often have you had to push or strain to begin urination? 0 (P)     How many times did you most typically get up to urinate from the time you went to bed at night until the time you got up in the morning? 1 (P)     Quality of Life: If you were to spend the rest of your life with your urinary condition just the way it is now, how would you feel about that? 1 (P)     AUA SYMPTOM SCORE 8 (P)         Objective:      /80 (BP Location: Left arm, Patient Position: Sitting, Cuff Size: Large)   Pulse 81   Ht 5' 10" (1 778 m)   Wt 85 3 kg (188 lb)   BMI 26 98 kg/m²          Physical Exam  Constitutional:       General: He is not in acute distress  Appearance: Normal appearance   He is well-developed, normal weight and well-nourished  He is not ill-appearing, toxic-appearing or diaphoretic  HENT:      Head: Normocephalic and atraumatic  Eyes:      General: No scleral icterus  Conjunctiva/sclera: Conjunctivae normal    Cardiovascular:      Rate and Rhythm: Normal rate  Pulmonary:      Effort: Pulmonary effort is normal    Abdominal:      General: There is no distension  Palpations: There is no mass  Tenderness: There is no abdominal tenderness  There is no right CVA tenderness, left CVA tenderness, guarding or rebound  Comments: No hernia   Musculoskeletal:      Cervical back: Neck supple  Skin:     General: Skin is warm and dry  Neurological:      Mental Status: He is alert and oriented to person, place, and time  Psychiatric:         Mood and Affect: Mood and affect normal          Behavior: Behavior normal          Thought Content:  Thought content normal          Judgment: Judgment normal

## 2023-01-25 ENCOUNTER — TELEPHONE (OUTPATIENT)
Dept: UROLOGY | Facility: MEDICAL CENTER | Age: 78
End: 2023-01-25

## 2023-01-25 NOTE — TELEPHONE ENCOUNTER
Please review patients 1/17/2023 PSA results in reference to his 11/2022 results  TransP BX was discussed at last visit

## 2023-01-30 NOTE — TELEPHONE ENCOUNTER
Component      PSA, Total   PSA, Free   PSA, % Free   Prostate Specific Antigen     Component 01/17/2023 11/04/2022 01/25/2022 01/12/2021            PSA, Total -- 25 40 High     13 10 High     14 20 High      Load older lab results   PSA, Free 3 73 -- -- -- Load older lab results   PSA, % Free 25 -- -- -- Load older lab results   Prostate Specific Antigen 14 73 High     23 23 High      -- --      Patient should proceed with prostate biopsy as discussed with the office with Dr Stephen Nelson

## 2023-03-24 ENCOUNTER — OFFICE VISIT (OUTPATIENT)
Dept: SLEEP CENTER | Facility: CLINIC | Age: 78
End: 2023-03-24

## 2023-03-24 VITALS
HEIGHT: 70 IN | OXYGEN SATURATION: 91 % | BODY MASS INDEX: 27.66 KG/M2 | SYSTOLIC BLOOD PRESSURE: 157 MMHG | DIASTOLIC BLOOD PRESSURE: 74 MMHG | WEIGHT: 193.2 LBS | HEART RATE: 71 BPM

## 2023-03-24 DIAGNOSIS — R68.2 DRY MOUTH: ICD-10-CM

## 2023-03-24 DIAGNOSIS — G25.81 RESTLESS LEG SYNDROME: ICD-10-CM

## 2023-03-24 DIAGNOSIS — E66.3 OVERWEIGHT (BMI 25.0-29.9): ICD-10-CM

## 2023-03-24 DIAGNOSIS — F41.9 ANXIETY: ICD-10-CM

## 2023-03-24 DIAGNOSIS — G47.33 OSA (OBSTRUCTIVE SLEEP APNEA): Primary | ICD-10-CM

## 2023-03-24 RX ORDER — BUSPIRONE HYDROCHLORIDE 5 MG/1
5 TABLET ORAL 2 TIMES DAILY
COMMUNITY
Start: 2023-03-12

## 2023-03-24 NOTE — PATIENT INSTRUCTIONS

## 2023-03-24 NOTE — PROGRESS NOTES
Follow-Up Note - Spencer Chappell  69 y o  male  :1945  INDRA:[id number]  DOS:3/24/2023    CC: I saw this patient for follow-up in clinic today for Sleep disordered breathing, Coexisting Sleep and Medical Problems  Patient received ot a  Dream Station Version 2 machine from [employer] several months ago  Results of prior studies in 2019: The diagnostic study confirmed moderate obstructive sleep apnea:  AHI 17/hour considerably higher while supine  Intermittent snoring of moderate intensity was noted  Minimum oxygen saturation 81 %  12 6 minutes of total sleep time was spent with saturations less than 90%  There were also severe periodic limb movements of sleep  During the subsequent therapeutic study, sleep disordered breathing was adequately remediated with PAP at 9 cm H2O  There were mild periodic limb movements of sleep     PFSH, Problem List, Medications & Allergies were reviewed in EMR  Interval changes: None reported  He  has a past medical history of Adenocarcinoma of prostate, stage 1 ([facility] [address] ) (2017), Anxiety (2021), BPH without obstruction/lower urinary tract symptoms, Elevated PSA, Erectile dysfunction, Hypercholesteremia, Hypertension, Malignant neoplasm prostate ([facility] [address] ), GABRIELLE (obstructive sleep apnea), and Prostate nodule  He has a current medication list which includes the following prescription(s): alprazolam, ascorbic acid, buspirone, calcium, mirtazapine, rosuvastatin, sildenafil, and valsartan-hydrochlorothiazide  PHYSIOLOGICAL DATA REVIEW AND INTERPRETATION: Using PAP > 4 hours/night 60%  Device has been used 30 out of 30 days and average on days used is 4 hours and 18 minutes estimated IMELDA 0 8/hour with pressure of 10cm Alie@Myagi percentile; compliance is Very good; Pressure setting is:adequate: optimal; He is experiencing some adverse effects:removes mask unknowingly;  Patient has not been using non FDA approved devices to sanitize the machine  SUBJECTIVE: With respect to use of PAP, Sheri Clark reports is benefiting from use and is satisfied    Other issues: None reported; restless leg symptoms are currently infrequently and not disturbing sleep  Sleep Routine: Sheri Clark reports getting 6 5 hrs sleep; he has no difficulty initiating, but reports diffculty maintaining sleep   He attributes to racing thoughts due to anxiety for which he is on medications  He feels some improvement since he is taking alprazolam  He arises spontaneously and feels refreshed  Sheri Clark reports excessive daytime sleepiness, [feels like napping but avoids ] He rated [himself] at Total score: 6 /24 on the [place] Sleepiness Scale  Habits:[ reports that he has never smoked  He has never used smokeless tobacco ], [ reports current alcohol use ], [ reports no history of drug use ], Caffeine use:limited; Exercise routine: regular  ROS: Significant for few pounds weight gain  He reported no nasal, respiratory or cardiac symptoms  EXAM: /74 (BP Location: Left arm, Patient Position: Sitting, Cuff Size: Standard)   Pulse 71   Ht 5' 10" (1 778 m)   Wt 87 6 kg (193 lb 3 2 oz)   SpO2 91%   BMI 27 72 kg/m²     Wt Readings from Last 3 Encounters:   03/24/23 87 6 kg (193 lb 3 2 oz)   02/10/23 86 2 kg (190 lb)   11/22/22 85 3 kg (188 lb)      Patient is well groomed; well appearing  CNS: Alert, orientated, clear & coherent speech  Psych: cooperative and in no distress  Mental state: Appears normal   H&N: EOMI; NC/AT: No facial pressure marks, no rashes  Skin/Extrem: col & hydration normal; no edema  Resp: Respiratory effort is normal  Physical findings otherwise essentially unchanged from previous  IMPRESSION: Problem List Items & Comorbidities Addressed this Visit    1  GABRIELLE (obstructive sleep apnea)  PAP DME Resupply/Reorder      2  Restless leg syndrome        3  Dry mouth        4  Anxiety        5  Overweight (BMI 25 0-29  9)            PLAN:  1   I reviewed results of prior studies and physiologic data with the patient  2  I discussed treatment options with risks and benefits  3  Treatment with  PAP is medically necessary and Shirnicole Conleye is agreable to continue use  4  Care of equipment, methods to improve comfort using PAP and importance of compliance with therapy were discussed  5  Pressure setting: continue 9-11 cmH2O     6  Rx provided to replace supplies and Care coordinated with DME provider  7  No medication is needed for the restless leg symptoms  8  Discussed strategies for weight maintenance  9  Follow-up is advised in 1 year or sooner if needed to monitor progress, compliance and to adjust therapy  Thank you for allowing me to participate in the care of this patient  Sincerely,     Authenticated electronically on 65/46/05   Board Certified Specialist     Portions of the record may have been created with voice recognition software  Occasional wrong word or "sound a like" substitutions may have occurred due to the inherent limitations of voice recognition software  There may also be notations and random deletions of words or characters from malfunctioning software  Read the chart carefully and recognize, using context, where substitutions/deletions have occurred

## 2023-03-27 ENCOUNTER — TELEPHONE (OUTPATIENT)
Dept: SLEEP CENTER | Facility: CLINIC | Age: 78
End: 2023-03-27

## 2023-03-28 LAB

## 2023-04-20 NOTE — LETTER
January 5, 2019     Duke Walton MD  [address] [place] [address]    Patient: Skip Brewster   YOB: 1945   Date of Visit: 1/4/2019       Dear Dr Adams Mean: Thank you for referring Gualberto Unger to me for evaluation  Below are my notes for this consultation  If you have questions, please do not hesitate to call me  I look forward to following your patient along with you  Sincerely,        Ashlyn Good MD        CC: No Recipients  Ashlyn Good MD  1/5/2019 11:53 AM  Sign at close encounter  Assessment/Plan:    Malignant neoplasm of prostate [facility])  Continue active surveillance  The patient has elevated PSA is consistent with the size of his prostate  PSA doubling time of 6 6 years indicates essentially benign disease  Diagnoses and all orders for this visit:    Malignant neoplasm of prostate ([facility] [address] )  -     POCT urine dip auto non-scope  -     PSA, Total Screen; Future          Subjective:      Patient ID: Skip Brewster is a 68 y o  male  HPI  Prostate cancer:   Patient has been on active surveillance for approximately 4 years  He has no signs or symptoms of advancing disease  His PSA in May 2017 was 9 79  In December 2017 it was 11 90 and PSA on December 19, 2018 was 12  6       The patient had a PSA of 9 55 in September 2016  A biopsy in November 2016 was benign  The patient's prostate measured 95 g at that time  PSA doubling time since his last biopsy is 6 6 years, consistent with largely benign disease  PSA density 0 13  He notes urinary frequency, nocturia x 1  He denies other significant urinary symptoms  He denies gross hematuria, urinary tract infections or incontinence  He is taking neither medications nor supplements for his symptoms  AUA SYMPTOM SCORE      Most Recent Value   AUA SYMPTOM SCORE   How often have you had a sensation of not emptying your bladder completely after you finished urinating?   0   How often have you had to urinate again less than two hours after you finished urinating? 2   How often have you found you stopped and started again several times when you urinate? 1   How often have you found it difficult to postpone urination? 1   How often have you had a weak urinary stream?  1   How often have you had to push or strain to begin urination? 0   How many times did you most typically get up to urinate from the time you went to bed at night until the time you got up in the morning? 1   Quality of Life: If you were to spend the rest of your life with your urinary condition just the way it is now, how would you feel about that?  2   AUA SYMPTOM SCORE  6            Erectile dysfunction:   Using sildenafil successfully  The following portions of the patient's history were reviewed and updated as appropriate: allergies, current medications, past family history, past medical history, past social history, past surgical history and problem list     Review of Systems   Constitutional: Negative for activity change and fatigue  Respiratory: Negative for shortness of breath and wheezing  Cardiovascular: Negative for chest pain  Hypertension  Gastrointestinal: Negative for abdominal pain  Genitourinary: Negative for difficulty urinating, dysuria, frequency, hematuria and urgency  Musculoskeletal: Negative for back pain and gait problem  Skin: Negative  Allergic/Immunologic: Negative  Neurological: Negative  Psychiatric/Behavioral: Negative  Objective:      /78 (BP Location: Left arm, Patient Position: Sitting, Cuff Size: Adult)   Pulse 62   Ht 5' 9" (1 753 m)   Wt 82 6 kg (182 lb)   BMI 26 88 kg/m²           Physical Exam   Constitutional: He is oriented to person, place, and time  He appears well-developed and well-nourished  HENT:   Head: Normocephalic and atraumatic  Eyes: EOM are normal    Neck: Normal range of motion  Neck supple     Pulmonary/Chest: Effort normal    Genitourinary: Rectum normal    Genitourinary Comments: The prostate is 80 g, firm, smooth, nontender  I am unable to feel the base  The prostate measured 95 g by ultrasound in November 2016  Musculoskeletal: Normal range of motion  Neurological: He is alert and oriented to person, place, and time  Skin: Skin is warm and dry  Psychiatric: He has a normal mood and affect   His behavior is normal  Judgment and thought content normal  15

## 2023-07-24 ENCOUNTER — ANESTHESIA EVENT (OUTPATIENT)
Dept: PERIOP | Facility: AMBULARY SURGERY CENTER | Age: 78
End: 2023-07-24
Payer: MEDICARE

## 2023-07-25 ENCOUNTER — APPOINTMENT (OUTPATIENT)
Dept: LAB | Facility: HOSPITAL | Age: 78
End: 2023-07-25
Payer: MEDICARE

## 2023-07-25 DIAGNOSIS — R39.89 SUSPECTED UTI: ICD-10-CM

## 2023-07-25 DIAGNOSIS — C61 MALIGNANT NEOPLASM OF PROSTATE (HCC): ICD-10-CM

## 2023-07-25 DIAGNOSIS — R97.20 ELEVATED PROSTATE SPECIFIC ANTIGEN (PSA): ICD-10-CM

## 2023-07-25 DIAGNOSIS — Z01.812 PRE-OPERATIVE LABORATORY EXAMINATION: ICD-10-CM

## 2023-07-25 DIAGNOSIS — Z01.810 PRE-OPERATIVE CARDIOVASCULAR EXAMINATION: ICD-10-CM

## 2023-07-25 LAB
ALBUMIN SERPL BCP-MCNC: 4.2 G/DL (ref 3.5–5)
ALP SERPL-CCNC: 45 U/L (ref 34–104)
ALT SERPL W P-5'-P-CCNC: 16 U/L (ref 7–52)
ANION GAP SERPL CALCULATED.3IONS-SCNC: 4 MMOL/L
AST SERPL W P-5'-P-CCNC: 16 U/L (ref 13–39)
ATRIAL RATE: 64 BPM
BASOPHILS # BLD AUTO: 0.08 THOUSANDS/ÂΜL (ref 0–0.1)
BASOPHILS NFR BLD AUTO: 1 % (ref 0–1)
BILIRUB SERPL-MCNC: 0.49 MG/DL (ref 0.2–1)
BUN SERPL-MCNC: 21 MG/DL (ref 5–25)
CALCIUM SERPL-MCNC: 9.7 MG/DL (ref 8.4–10.2)
CHLORIDE SERPL-SCNC: 104 MMOL/L (ref 96–108)
CO2 SERPL-SCNC: 33 MMOL/L (ref 21–32)
CREAT SERPL-MCNC: 1.16 MG/DL (ref 0.6–1.3)
EOSINOPHIL # BLD AUTO: 0.15 THOUSAND/ÂΜL (ref 0–0.61)
EOSINOPHIL NFR BLD AUTO: 2 % (ref 0–6)
ERYTHROCYTE [DISTWIDTH] IN BLOOD BY AUTOMATED COUNT: 13.2 % (ref 11.6–15.1)
GFR SERPL CREATININE-BSD FRML MDRD: 60 ML/MIN/1.73SQ M
GLUCOSE SERPL-MCNC: 96 MG/DL (ref 65–140)
HCT VFR BLD AUTO: 45.1 % (ref 36.5–49.3)
HGB BLD-MCNC: 14.2 G/DL (ref 12–17)
IMM GRANULOCYTES # BLD AUTO: 0.04 THOUSAND/UL (ref 0–0.2)
IMM GRANULOCYTES NFR BLD AUTO: 1 % (ref 0–2)
LYMPHOCYTES # BLD AUTO: 2.21 THOUSANDS/ÂΜL (ref 0.6–4.47)
LYMPHOCYTES NFR BLD AUTO: 29 % (ref 14–44)
MCH RBC QN AUTO: 28.7 PG (ref 26.8–34.3)
MCHC RBC AUTO-ENTMCNC: 31.5 G/DL (ref 31.4–37.4)
MCV RBC AUTO: 91 FL (ref 82–98)
MONOCYTES # BLD AUTO: 0.64 THOUSAND/ÂΜL (ref 0.17–1.22)
MONOCYTES NFR BLD AUTO: 8 % (ref 4–12)
NEUTROPHILS # BLD AUTO: 4.55 THOUSANDS/ÂΜL (ref 1.85–7.62)
NEUTS SEG NFR BLD AUTO: 59 % (ref 43–75)
NRBC BLD AUTO-RTO: 0 /100 WBCS
P AXIS: 46 DEGREES
PLATELET # BLD AUTO: 248 THOUSANDS/UL (ref 149–390)
PMV BLD AUTO: 10 FL (ref 8.9–12.7)
POTASSIUM SERPL-SCNC: 3.9 MMOL/L (ref 3.5–5.3)
PR INTERVAL: 162 MS
PROT SERPL-MCNC: 7.5 G/DL (ref 6.4–8.4)
QRS AXIS: -4 DEGREES
QRSD INTERVAL: 114 MS
QT INTERVAL: 424 MS
QTC INTERVAL: 437 MS
RBC # BLD AUTO: 4.94 MILLION/UL (ref 3.88–5.62)
SODIUM SERPL-SCNC: 141 MMOL/L (ref 135–147)
T WAVE AXIS: 5 DEGREES
VENTRICULAR RATE: 64 BPM
WBC # BLD AUTO: 7.67 THOUSAND/UL (ref 4.31–10.16)

## 2023-07-25 PROCEDURE — 93010 ELECTROCARDIOGRAM REPORT: CPT | Performed by: INTERNAL MEDICINE

## 2023-07-25 PROCEDURE — 80053 COMPREHEN METABOLIC PANEL: CPT

## 2023-07-25 PROCEDURE — 87086 URINE CULTURE/COLONY COUNT: CPT

## 2023-07-25 PROCEDURE — 85025 COMPLETE CBC W/AUTO DIFF WBC: CPT

## 2023-07-25 PROCEDURE — 36415 COLL VENOUS BLD VENIPUNCTURE: CPT

## 2023-07-26 LAB — BACTERIA UR CULT: NORMAL

## 2023-08-03 RX ORDER — MELOXICAM 15 MG/1
15 TABLET ORAL DAILY
COMMUNITY
Start: 2023-07-07 | End: 2023-08-08

## 2023-08-03 NOTE — PRE-PROCEDURE INSTRUCTIONS
Pre-Surgery Instructions:   Medication Instructions   • ALPRAZolam (XANAX) 0.5 mg tablet Uses PRN- OK to take day of surgery   • Ascorbic Acid (MAC-C PO) Last dose 8/1   • CALCIUM PO Last dose 8/1   • meloxicam (MOBIC) 15 mg tablet Stop taking 3 days prior to surgery. • mirtazapine (REMERON) 15 mg tablet Take night before surgery   • rosuvastatin (CRESTOR) 20 MG tablet Take day of surgery. • sildenafil (REVATIO) 20 mg tablet Uses PRN- DO NOT take day of surgery   • valsartan-hydrochlorothiazide (DIOVAN-HCT) 160-25 MG per tablet Hold day of surgery. Medication instructions for day surgery reviewed. Please use only a sip of water to take your instructed medications. Avoid all over the counter vitamins, supplements and NSAIDS for one week prior to surgery per anesthesia guidelines. Tylenol is ok to take as needed. You will receive a call one business day prior to surgery with an arrival time and hospital directions. If your surgery is scheduled on a Monday, the hospital will be calling you on the Friday prior to your surgery. If you have not heard from anyone by 8pm, please call the hospital supervisor through the hospital  at [phone]. Nikhil Chris [phone]). Do not eat or drink anything after midnight the night before your surgery, including candy, mints, lifesavers, or chewing gum. Do not drink alcohol 24hrs before your surgery. Try not to smoke at least 24hrs before your surgery. Follow the pre surgery showering instructions as listed in the [facility] Surgical Experience Booklet” or otherwise provided by your surgeon's office. Do not shave the surgical area 24 hours before surgery. Do not apply any lotions, creams, including makeup, cologne, deodorant, or perfumes after showering on the day of your surgery. Fleet enema instructions as per surgeon. No contact lenses, eye make-up, or artificial eyelashes.  Remove nail polish, including gel polish, and any artificial, gel, or acrylic nails if possible. Remove all jewelry including rings and body piercing jewelry. Wear causal clothing that is easy to take on and off. Consider your type of surgery. Keep any valuables, jewelry, piercings at home. Please bring any specially ordered equipment (sling, braces) if indicated. Arrange for a responsible person to drive you to and from the hospital on the day of your surgery. Visitor Guidelines discussed. Call the surgeon's office with any new illnesses, exposures, or additional questions prior to surgery. Please reference your [facility] Surgical Experience Booklet” for additional information to prepare for your upcoming surgery.

## 2023-08-04 NOTE — H&P
UROLOGY H&P NOTE     CHIEF COMPLAINT   Belkis Rangel is a 69 y.o. male with a complaint of No chief complaint on file. History of Present Illness:     69 y.o. male known to Dr. Marcel Reyes with a reported history of low risk prostate cancer diagnosed in 2016. He reports second biopsy around the same time for confirmation. Multiparametric MRI last year demonstrates low-level concern with PI-RADS 2 but a prostate size greater than 150 g. Recent PSA this year is consistent with an appropriate PSA density. Patient presents for confirmatory transperineal biopsy now year 7.    1/17/23  9:58 AM     Prostate Specific Antigen <4.00 ng/mL 14.73 High     PSA, Free ng/mL 3.73    PSA, % Free % 25          Past Medical History:     Past Medical History:   Diagnosis Date   • Adenocarcinoma of prostate, stage 1 ([address]) 12/27/2017   • Anxiety 05/21/2021   • BPH without obstruction/lower urinary tract symptoms    • CPAP (continuous positive airway pressure) dependence    • Elevated PSA    • Erectile dysfunction    • Hypercholesteremia    • Hypertension    • Malignant neoplasm prostate (HCC)    • GABRIELLE (obstructive sleep apnea)    • Prostate nodule        PAST SURGICAL HISTORY:     Past Surgical History:   Procedure Laterality Date   • COLONOSCOPY  12/2009    Dr Leeann Almeida, [facility]. Diverticulosis, otherwise normal.    • COLONOSCOPY  09/2000    Dr Leeann Almeida, [facility].  Normal.    • PROSTATE BIOPSY Bilateral 2014, 2016       CURRENT MEDICATIONS:     Current Facility-Administered Medications   Medication Dose Route Frequency Provider Last Rate Last Admin   • ceFAZolin (ANCEF) IVPB (premix in dextrose) 2,000 mg 50 mL  2,000 mg Intravenous Once Galo Marvin MD       • lactated ringers infusion  125 mL/hr Intravenous Continuous Cecilia Sosa MD       • lidocaine (PF) (XYLOCAINE-MPF) 1 % injection 0.5 mL  0.5 mL Infiltration Once PRN Cecilia Sosa MD           ALLERGIES:     Allergies   Allergen Reactions   • Penicillins      Unknown reaction as child. SOCIAL HISTORY:     Social History     Socioeconomic History   • Marital status: /Civil Union     Spouse name: None   • Number of children: None   • Years of education: None   • Highest education level: None   Occupational History   • None   Tobacco Use   • Smoking status: Never   • Smokeless tobacco: Never   Vaping Use   • Vaping Use: Never used   Substance and Sexual Activity   • Alcohol use: Yes     Comment: social   • Drug use: No   • Sexual activity: None   Other Topics Concern   • None   Social History Narrative   • None     Social Determinants of Health     Financial Resource Strain: Not on file   Food Insecurity: Not on file   Transportation Needs: Not on file   Physical Activity: Not on file   Stress: Not on file   Social Connections: Not on file   Intimate Partner Violence: Not on file   Housing Stability: Not on file       SOCIAL HISTORY:     Family History   Problem Relation Age of Onset   • Breast cancer Sister    • Prostate cancer Brother    • Heart attack Brother        REVIEW OF SYSTEMS:     Review of Systems   Constitutional: Negative for chills and fever. HENT: Negative for ear pain and sore throat. Eyes: Negative for pain and visual disturbance. Respiratory: Negative for cough and shortness of breath. Cardiovascular: Negative for chest pain and palpitations. Gastrointestinal: Negative for abdominal pain and vomiting. Genitourinary: Negative for difficulty urinating, dysuria and hematuria. Musculoskeletal: Negative for arthralgias and back pain. Skin: Negative for color change and rash. Neurological: Negative for seizures and syncope. All other systems reviewed and are negative. PHYSICAL EXAM:     /81   Pulse 70   Temp 98.2 °F (36.8 °C) (Temporal)   Resp 18   Ht 5' 10" (1.778 m)   Wt 86.2 kg (190 lb)   SpO2 98%   BMI 27.26 kg/m²     General:  Healthy appearing male in no acute distress. They have a normal affect.   There is not appear to be any gross neurologic defects or abnormalities. HEENT:  Normocephalic, atraumatic. Neck is supple without any palpable lymphadenopathy  Cardiovascular:  Patient has normal palpable distal radial pulses. There is no significant peripheral edema. No JVD is noted. Respiratory:  Patient has unlabored respirations. There is no audible wheeze or rhonchi. Abdomen:  Abdomen is soft and nontender. There is no tympany. Inguinal and umbilical hernia are not appreciated. Genitourinary: Deferred until OR    Musculoskeletal:  Patient does not have significant CVA tenderness in the  flank with palpation or percussion. They full range of motion in all 4 extremities. Strength in all 4 extremities appears congruent. Patient is able to ambulate without assistance or difficulty. Dermatologic:  Patient has no skin abnormalities or rashes. LABS:     CBC:   Lab Results   Component Value Date    WBC 7.67 2023    HGB 14.2 2023    HCT 45.1 2023    MCV 91 2023     2023       BMP:   Lab Results   Component Value Date    CALCIUM 9.7 2023    K 3.9 2023    CO2 33 (H) 2023     2023    BUN 21 2023    CREATININE 1.16 2023       IMAGIN/14/23MULTIPARAMETRIC MRI OF THE PROSTATE WITH AND WITHOUT CONTRAST-WITH 3-D POSTPROCESSING.     INDICATION:   C61: Malignant neoplasm of prostate. Active surveillance.     COMPARISON: MR prostate 2020.     PSA LEVEL: 13.1 ng/ml 2022  PRIOR BIOPSY DATE: 2016.   BIOPSY RESULTS: [place] 3+3 = 6 involving less than 5% of one core.     TECHNIQUE: The following pulse sequences were obtained:  Small field-of-view axial T1-weighted and multiplanar T2-weighted images; DWI axial and ADC map; large field of view axial T2 weighted images; T1w in-phase and opposed-phase axials of entire pelvis   and dynamic 3D T1w axial before and during IV contrast injection.        CONTRAST: Gadobutrol (Gadavist) 8 mL of Gadobutrol injection (SINGLE-DOSE)     TECHNICAL LIMITATIONS: None.     FINDINGS:     PROSTATE:     Size: 6.9 x 6.7 x 6.9 cm = 153.6 cc. Post-biopsy hemorrhage:  None. Central gland enlargement (BPH): Marked.     Focal lesions - No dominant lesion. Heterogeneous peripheral zone. PI-RADSv2.1 Category 2 - Low (clinically significant cancer unlikely).       SEMINAL VESICLES: No suspicious findings. Fluid levels are noted.     Note: Clinically significant cancer is defined on pathology/histology as [place] score greater than or equal to 7, and/or volume of greater than or equal to 0.5 mL, and/or extraprostatic extension.     URINARY BLADDER: Unremarkable.     LYMPH NODES: No pelvic lymphadenopathy.     BONES: No suspicious osseous lesion.           IMPRESSION:     1. PI-RADSv2.1 Category 2 - Low (clinically significant cancer is unlikely to be present).     2. Marked BPH with calculated prostate volume of 154 cc. PATHOLOGY:     2016 pathology not available    ASSESSMENT:     69 y.o. male with very large prostate, remote low risk cancer diagnosis, plan for confirmatory biopsy    PLAN:     Proceed with transperineal biopsy of the prostate for confirmation of patient's known history of prostate cancer on active surveillance. Risk and benefits discussed and described including the higher than average risk of bleeding and urinary retention given the large size of the patient's gland.

## 2023-08-08 ENCOUNTER — HOSPITAL ENCOUNTER (OUTPATIENT)
Facility: AMBULARY SURGERY CENTER | Age: 78
Setting detail: OUTPATIENT SURGERY
Discharge: HOME | End: 2023-08-08
Attending: UROLOGY | Admitting: UROLOGY
Payer: MEDICARE

## 2023-08-08 ENCOUNTER — TELEPHONE (OUTPATIENT)
Dept: UROLOGY | Facility: CLINIC | Age: 78
End: 2023-08-08

## 2023-08-08 ENCOUNTER — ANESTHESIA (OUTPATIENT)
Dept: PERIOP | Facility: AMBULARY SURGERY CENTER | Age: 78
End: 2023-08-08
Payer: MEDICARE

## 2023-08-08 VITALS
SYSTOLIC BLOOD PRESSURE: 125 MMHG | HEIGHT: 70 IN | DIASTOLIC BLOOD PRESSURE: 69 MMHG | WEIGHT: 190 LBS | TEMPERATURE: 96.9 F | BODY MASS INDEX: 27.2 KG/M2 | RESPIRATION RATE: 16 BRPM | OXYGEN SATURATION: 96 % | HEART RATE: 86 BPM

## 2023-08-08 DIAGNOSIS — R97.20 ELEVATED PROSTATE SPECIFIC ANTIGEN (PSA): ICD-10-CM

## 2023-08-08 DIAGNOSIS — R33.9 URINARY RETENTION: Primary | ICD-10-CM

## 2023-08-08 DIAGNOSIS — C61 MALIGNANT NEOPLASM OF PROSTATE (HCC): ICD-10-CM

## 2023-08-08 PROCEDURE — 88344 IMHCHEM/IMCYTCHM EA MLT ANTB: CPT | Performed by: PATHOLOGY

## 2023-08-08 PROCEDURE — G0416 PROSTATE BIOPSY, ANY MTHD: HCPCS | Performed by: PATHOLOGY

## 2023-08-08 RX ORDER — TAMSULOSIN HYDROCHLORIDE 0.4 MG/1
0.4 CAPSULE ORAL ONCE
Status: COMPLETED | OUTPATIENT
Start: 2023-08-08 | End: 2023-08-08

## 2023-08-08 RX ORDER — SODIUM CHLORIDE, SODIUM LACTATE, POTASSIUM CHLORIDE, CALCIUM CHLORIDE 600; 310; 30; 20 MG/100ML; MG/100ML; MG/100ML; MG/100ML
50 INJECTION, SOLUTION INTRAVENOUS CONTINUOUS
Status: DISCONTINUED | OUTPATIENT
Start: 2023-08-08 | End: 2023-08-08 | Stop reason: HOSPADM

## 2023-08-08 RX ORDER — FUROSEMIDE 10 MG/ML
INJECTION INTRAMUSCULAR; INTRAVENOUS AS NEEDED
Status: DISCONTINUED | OUTPATIENT
Start: 2023-08-08 | End: 2023-08-08

## 2023-08-08 RX ORDER — ALPRAZOLAM 0.5 MG/1
0.5 TABLET ORAL ONCE
Status: DISCONTINUED | OUTPATIENT
Start: 2023-08-08 | End: 2023-08-08 | Stop reason: HOSPADM

## 2023-08-08 RX ORDER — LIDOCAINE HYDROCHLORIDE 20 MG/ML
INJECTION, SOLUTION EPIDURAL; INFILTRATION; INTRACAUDAL; PERINEURAL AS NEEDED
Status: DISCONTINUED | OUTPATIENT
Start: 2023-08-08 | End: 2023-08-08 | Stop reason: HOSPADM

## 2023-08-08 RX ORDER — LIDOCAINE HYDROCHLORIDE 10 MG/ML
0.5 INJECTION, SOLUTION EPIDURAL; INFILTRATION; INTRACAUDAL; PERINEURAL ONCE AS NEEDED
Status: DISCONTINUED | OUTPATIENT
Start: 2023-08-08 | End: 2023-08-08 | Stop reason: HOSPADM

## 2023-08-08 RX ORDER — LIDOCAINE HYDROCHLORIDE 10 MG/ML
INJECTION, SOLUTION EPIDURAL; INFILTRATION; INTRACAUDAL; PERINEURAL AS NEEDED
Status: DISCONTINUED | OUTPATIENT
Start: 2023-08-08 | End: 2023-08-08

## 2023-08-08 RX ORDER — PROPOFOL 10 MG/ML
INJECTION, EMULSION INTRAVENOUS CONTINUOUS PRN
Status: DISCONTINUED | OUTPATIENT
Start: 2023-08-08 | End: 2023-08-08

## 2023-08-08 RX ORDER — ONDANSETRON 2 MG/ML
4 INJECTION INTRAMUSCULAR; INTRAVENOUS ONCE AS NEEDED
Status: DISCONTINUED | OUTPATIENT
Start: 2023-08-08 | End: 2023-08-08 | Stop reason: HOSPADM

## 2023-08-08 RX ORDER — SODIUM CHLORIDE, SODIUM LACTATE, POTASSIUM CHLORIDE, CALCIUM CHLORIDE 600; 310; 30; 20 MG/100ML; MG/100ML; MG/100ML; MG/100ML
INJECTION, SOLUTION INTRAVENOUS CONTINUOUS PRN
Status: DISCONTINUED | OUTPATIENT
Start: 2023-08-08 | End: 2023-08-08

## 2023-08-08 RX ORDER — ACETAMINOPHEN 325 MG/1
650 TABLET ORAL EVERY 6 HOURS PRN
Status: DISCONTINUED | OUTPATIENT
Start: 2023-08-08 | End: 2023-08-08 | Stop reason: HOSPADM

## 2023-08-08 RX ORDER — CEFAZOLIN SODIUM 2 G/50ML
SOLUTION INTRAVENOUS AS NEEDED
Status: DISCONTINUED | OUTPATIENT
Start: 2023-08-08 | End: 2023-08-08

## 2023-08-08 RX ORDER — SODIUM CHLORIDE, SODIUM LACTATE, POTASSIUM CHLORIDE, CALCIUM CHLORIDE 600; 310; 30; 20 MG/100ML; MG/100ML; MG/100ML; MG/100ML
125 INJECTION, SOLUTION INTRAVENOUS CONTINUOUS
Status: DISCONTINUED | OUTPATIENT
Start: 2023-08-08 | End: 2023-08-08 | Stop reason: HOSPADM

## 2023-08-08 RX ORDER — FENTANYL CITRATE/PF 50 MCG/ML
25 SYRINGE (ML) INJECTION
Status: DISCONTINUED | OUTPATIENT
Start: 2023-08-08 | End: 2023-08-08 | Stop reason: HOSPADM

## 2023-08-08 RX ORDER — TAMSULOSIN HYDROCHLORIDE 0.4 MG/1
0.4 CAPSULE ORAL
Qty: 90 CAPSULE | Refills: 3 | Status: SHIPPED | OUTPATIENT
Start: 2023-08-08 | End: 2024-08-02

## 2023-08-08 RX ORDER — CEFAZOLIN SODIUM 2 G/50ML
2000 SOLUTION INTRAVENOUS ONCE
Status: DISCONTINUED | OUTPATIENT
Start: 2023-08-08 | End: 2023-08-08 | Stop reason: HOSPADM

## 2023-08-08 RX ORDER — PROPOFOL 10 MG/ML
INJECTION, EMULSION INTRAVENOUS AS NEEDED
Status: DISCONTINUED | OUTPATIENT
Start: 2023-08-08 | End: 2023-08-08

## 2023-08-08 RX ORDER — LIDOCAINE HYDROCHLORIDE 20 MG/ML
1 JELLY TOPICAL ONCE
Status: DISCONTINUED | OUTPATIENT
Start: 2023-08-08 | End: 2023-08-08 | Stop reason: HOSPADM

## 2023-08-08 RX ADMIN — SODIUM CHLORIDE, SODIUM LACTATE, POTASSIUM CHLORIDE, AND CALCIUM CHLORIDE: .6; .31; .03; .02 INJECTION, SOLUTION INTRAVENOUS at 06:58

## 2023-08-08 RX ADMIN — LIDOCAINE HYDROCHLORIDE 50 MG: 10 INJECTION, SOLUTION EPIDURAL; INFILTRATION; INTRACAUDAL at 07:33

## 2023-08-08 RX ADMIN — CEFAZOLIN SODIUM 2000 MG: 2 SOLUTION INTRAVENOUS at 07:28

## 2023-08-08 RX ADMIN — TAMSULOSIN HYDROCHLORIDE 0.4 MG: 0.4 CAPSULE ORAL at 09:07

## 2023-08-08 RX ADMIN — FUROSEMIDE 10 MG: 10 INJECTION, SOLUTION INTRAMUSCULAR; INTRAVENOUS at 07:39

## 2023-08-08 RX ADMIN — PROPOFOL 120 MCG/KG/MIN: 10 INJECTION, EMULSION INTRAVENOUS at 07:33

## 2023-08-08 RX ADMIN — PROPOFOL 50 MG: 10 INJECTION, EMULSION INTRAVENOUS at 07:33

## 2023-08-08 NOTE — TELEPHONE ENCOUNTER
Pt had procedure done today and he has some questions about the catheter. He has blood in at the tips of his penis and sometimes he has the urge to urinate but he can't go. He states it only last for a couple seconds but it is very strong urge.  He just wants to know if this is all normal.     Pt can be reached at [phone]

## 2023-08-08 NOTE — TELEPHONE ENCOUNTER
Patient with 150 g prostate. Postbiopsy retention. Catheter placed. Started on Flomax. Will need void trial appointment later this week.   Patient of Dr. Sonali Shine

## 2023-08-08 NOTE — QUICK NOTE
Patient with very large prostate now status post transperineal biopsy. Unfortunately, the patient is unable to urinate in the recovery room. Postvoid residual checked greater than 350 cc. The patient is uncomfortable. He was given a dose of Flomax and Lasix and is still unable to void. I discussed with nursing giving the patient a dose of his home Xanax medication given ongoing anxiety, inserting a 16 Cymro coudé Pope catheter with 2% viscous lidocaine. The patient will be discharged home with Flomax and I will communicate the need for postoperative catheter to his primary urologist, Dr. Jhony Dennison. Plan for trial of void later this week can be entertained.

## 2023-08-08 NOTE — OP NOTE
OPERATIVE REPORT  PATIENT NAME: Rupal Gutiérrez    :  1945  MRN: [id number]  Pt Location: [facility] OR [facility] 04    SURGERY DATE: 2023    Surgeon(s) and Role:     * Adán Rose MD - Primary    Preop Diagnosis:  Malignant neoplasm of prostate ([address]) Jc Razo  Elevated prostate specific antigen (PSA) [R97.20]    Post-Op Diagnosis Codes:     * Malignant neoplasm of prostate (HCC) [C61]     * Elevated prostate specific antigen (PSA) [R97.20]    Procedure(s):  TRANSPERINEAL ULTRASOUND GUIDED BIOPSY PROSTATE    Specimen(s):  ID Type Source Tests Collected by Time Destination   1 : RIGHT ANTERIOR MEDIAL Tissue Prostate TISSUE EXAM Adán Rose MD 2023 5591    2 : RIGHT ANTERIOR LATERAL Tissue Prostate TISSUE EXAM Adán Rose MD 2023 0740    3 : RIGHT POSTERIOR LATERAL Tissue Prostate TISSUE EXAM Adán Rose MD 2023 0033    4 : LEFT ANTERIOR MEDIAL Tissue Prostate TISSUE EXAM Adán Rose MD 2023 4078    5 : LEFT ANTERIOR LATERAL Tissue Prostate TISSUE EXAM Adán Rose MD 2023 9086    6 : LEFT POSTERIOR LATERAL Tissue Prostate TISSUE EXAM Adán Rose MD 2023 4683    7 : LEFT POSTERIOR MEDIAL Tissue Prostate TISSUE EXAM Adán Rose MD 2023 5820    8 : LEFT BASE Tissue Prostate TISSUE EXAM Adán Rose MD 2023 9713    9 : RIGHT POSTERIOR MEDIAL Tissue Prostate TISSUE EXAM Adán Rose MD 2023 6417    10 : RIGHT BASE Tissue Prostate TISSUE EXAM Adán Rose MD 2023 0596        Estimated Blood Loss:   Minimal    Drains:  * No LDAs found *    Anesthesia Type:   IV Sedation with Anesthesia    Operative Indications:  Malignant neoplasm of prostate (HCC) [C61]  Elevated prostate specific antigen (PSA) [R97.20]      Operative Findings:  1.  10 geographic areas sampled, standard transperineal template for mapping and saturation  2.  24 total biopsies taken    Complications: None    Procedure and Technique:  Procedure was transperineal saturation prostate biopsy utilizing the Precision Point perineal access device utilized to map the standard geographic sites of the prostate. Jayde Gallagher is a 69 y.o. male with history of prostate cancer on surveillance. Patient has undergone prior biopsy of the prostate diagnosing low risk prostate cancer patient on surveillance protocol due for confirmatory biopsy. Patient did undergo pre biopsy multiparametric MRI of the prostate. The patient had a PI-RADS level 2 MRI with enlarged prostate size but no focal lesions. Because he was due for confirmatory biopsy on a surveillance protocol, transperineal biopsy scheduled. The patient was scheduled for his procedure in an outpatient surgical context with the assistance of the anesthesia team for sedation. He was met in the preoperative holding area by the performing urologist, the anesthesia team and the intraoperative nursing staff. The procedure along with its risks and benefits were discussed and reviewed. Patient signed an informed consent. Patient was then transferred to procedure suite. Pre-procedural time out was performed with all parties present. He was treated with periprocedural antibiotics, Ancef. He was placed in dorsal lithotomy with care to pad all pressure points. His perineum and genitalia were shave/prepped with ChloraPrep. The scrotum was elevated a secured aware from the perineum above the rectum. Side-fire, biplanar transrectal ultrasound was introduced and the prostate was imaged in the sagittal and axial views. With the assistance of the [address] [place] [address] technician, a survey of the prostate anatomy was performed. The technician then linked the previously obtained MRI images to the real-time ultrasound. The PIRADs lesions seen on MRI were identified on the ultrasound. In the midportion of the left and right prostate, a macy was denoted in the perineal skin.  Skin wheal was elevated with 2% lidocaine plain on either side. The PrecisionPoint access needle was then introduced into the left perineal subcutaneous tissue. We visualized the tip of the needle. Spinal needle was introduced through the subcutaneous fat and into the pelvic floor muscle where a perineal pudendal block was performed with additional local anesthetic. This was repeated on the patient's right side. Utilizing the [place] [facility] access needle and stepper, ultrasound guidance was utilized to place the spring-loaded biopsy needle into standard transperineal samples, which allowed us to carefully map and saturate each of the 10 zones that have subdivided the prostate into zonal anatomy. The Precision Point access needle and stepper was positioned into the RIGHT perineal space and ultrasound guidance was utilized to place the spring-loaded biopsy needle into the following areas    RIGHT anterior medial: 2 biopsies taken  RIGHT posterior medial: 3 biopsies taken  RIGHT posterior lateral: 4 biopsies taken  RIGHT base: 2 biopsies taken  RIGHT anterior lateral: 2 biopsies taken  The Precision Point access needle and stepper was re-positioned into the LEFT perineal space and ultrasound guidance was utilized to place the spring-loaded biopsy needle into the following areas  LEFT anterior medial: 2 biopsies taken  LEFT posterior medial: 2 biopsies taken  LEFT posterior lateral: 3 biopsies taken  LEFT base: 2 biopsies taken  LEFT anterior lateral: 2 biopsies taken  LEFT anterior medial: 2 biopsies taken    A total of 24 biopsies taken. Transrectal ultrasound removed. The scrotum was released. Some gentle pressure was placed on the perineum for approximately 5 minutes for hemostasis. At the completion of the procedure, the patient was taken out of dorsal lithotomy, recovered from their anesthesia, and transferred to PACU in good condition.      Overall, the patient tolerated the procedure and there were no complications.     Plan: The patient will be monitored in recovery, allowed to void prior to discharge and return for biopsy pathology review in the office with their primary urologist.    Patient Disposition:  PACU         SIGNATURE: Pam Messina MD  DATE: August 8, 2023  TIME: 7:54 AM

## 2023-08-08 NOTE — TELEPHONE ENCOUNTER
I spoke with the patient and let him know this sounds like hes having bladder spasms from the catheter being placed which is normal and to monitor this. Pope removal scheduled for this Thursday.

## 2023-08-08 NOTE — ANESTHESIA PREPROCEDURE EVALUATION
Procedure:  TRANSPERINEAL ULTRASOUND GUIDED BIOPSY PROSTATE (Perineum)    Relevant Problems   ANESTHESIA (within normal limits)      CARDIO   (+) Hypercholesteremia   (+) Hypertension      GI/HEPATIC   (-) Gastroesophageal reflux disease      /RENAL   (+) Adenocarcinoma of prostate, stage 1 (HCC)      NEURO/PSYCH   (+) Anxiety      PULMONARY   (+) GABRIELLE (obstructive sleep apnea)   (-) Smoking   (-) URI (upper respiratory infection)      Other   (+) CPAP (continuous positive airway pressure) dependence      Physical Exam    Airway    Mallampati score: II  TM Distance: >3 FB  Neck ROM: full     Dental   Comment: Rear temp crown, secure, No notable dental hx     Cardiovascular      Pulmonary      Other Findings       Lab Results   Component Value Date    WBC 7.67 07/25/2023    HGB 14.2 07/25/2023     07/25/2023     Lab Results   Component Value Date    SODIUM 141 07/25/2023    K 3.9 07/25/2023    BUN 21 07/25/2023    CREATININE 1.16 07/25/2023    EGFR 60 07/25/2023     Anesthesia Plan  ASA Score- 2     Anesthesia Type- IV sedation with anesthesia with ASA Monitors. Additional Monitors:   Airway Plan:           Plan Factors-Exercise tolerance (METS): >4 METS. Chart reviewed. Existing labs reviewed. Patient summary reviewed. Patient is not a current smoker. Induction- intravenous. Postoperative Plan-     Informed Consent- Anesthetic plan and risks discussed with patient and spouse. I personally reviewed this patient with the CRNA. Discussed and agreed on the Anesthesia Plan with the CRNA. Akilah Guevara

## 2023-08-08 NOTE — DISCHARGE INSTR - AVS FIRST PAGE
Today you underwent a prostate biopsy. It is normal to have some blood in your urine and/or stool over the next 48 to 72 hours. You should rest today and tomorrow and hydrate aggressively. The bleeding should clear or improve in this timeframe. If the bleeding worsens, our office should be contacted. However, it is normal to have blood in your semen or rust colored semen for up to a month. This is not cause for concern. If there is any concern for fevers/chills or infection signs or symptoms, our office should be contacted immediately. Please drink copious fluids and rest for the first 48 hours after your procedure. Ice can be applied to the perineal skin which is the skin between your scrotum and anus for comfort and inflammation control. Tylenol or acetaminophen can be used over-the-counter for low level discomfort.     Mobic and anti-inflammatories can resume in 48 hours after bleeding subsides

## 2023-08-08 NOTE — ANESTHESIA POSTPROCEDURE EVALUATION
Post-Op Assessment Note    CV Status:  Stable  Pain Score: 0    Pain management: adequate     Mental Status:  Sleepy   Hydration Status:  Euvolemic   PONV Controlled:  Controlled   Airway Patency:  Patent      Post Op Vitals Reviewed: Yes      Staff: CRNA         No notable events documented.     BP   143/71   Temp   97.7   Pulse 58   Resp 16   SpO2 99

## 2023-08-09 NOTE — TELEPHONE ENCOUNTER
Post Op Note    Jayde Gallagher is a 69 y.o. male s/p transperineal ultrasound guided biopsy prostate  performed 08/08/2023  Jayde Gallagher is a patient of Dr. Dave Kang and is seen at the [facility]. How would you rate your pain on a scale from 1 to 10, 10 being the worst pain ever? 2  Have you had a fever? No    Have your bowel movements been regular? Yes        If the patient has a lang- are you comfortable caring for your lang? Yes Is it draining urine? Yes  Do you have any other questions or concerns that I can address at this time? Contacted and spoke with the patient . Had several questions about the lang and the color of the urine. Questions answered. Encouraged patient to increase his water intake. Suggested stool softeners if needed. Scheduled for void trial tomorrow at [facility]. Follow up with Dr Dave Kang on 8/18/2023.

## 2023-08-10 ENCOUNTER — PROCEDURE VISIT (OUTPATIENT)
Dept: UROLOGY | Facility: MEDICAL CENTER | Age: 78
End: 2023-08-10

## 2023-08-10 VITALS
BODY MASS INDEX: 27.2 KG/M2 | SYSTOLIC BLOOD PRESSURE: 142 MMHG | WEIGHT: 190 LBS | DIASTOLIC BLOOD PRESSURE: 70 MMHG | HEIGHT: 70 IN

## 2023-08-10 DIAGNOSIS — C61 MALIGNANT NEOPLASM OF PROSTATE (HCC): Primary | ICD-10-CM

## 2023-08-10 PROCEDURE — 88344 IMHCHEM/IMCYTCHM EA MLT ANTB: CPT | Performed by: PATHOLOGY

## 2023-08-10 PROCEDURE — G0416 PROSTATE BIOPSY, ANY MTHD: HCPCS | Performed by: PATHOLOGY

## 2023-08-10 PROCEDURE — 99024 POSTOP FOLLOW-UP VISIT: CPT

## 2023-08-18 ENCOUNTER — OFFICE VISIT (OUTPATIENT)
Dept: UROLOGY | Facility: MEDICAL CENTER | Age: 78
End: 2023-08-18
Payer: MEDICARE

## 2023-08-18 VITALS
DIASTOLIC BLOOD PRESSURE: 76 MMHG | BODY MASS INDEX: 27.2 KG/M2 | HEIGHT: 70 IN | WEIGHT: 190 LBS | HEART RATE: 76 BPM | SYSTOLIC BLOOD PRESSURE: 140 MMHG

## 2023-08-18 DIAGNOSIS — C61 MALIGNANT NEOPLASM OF PROSTATE (HCC): Primary | ICD-10-CM

## 2023-08-18 PROCEDURE — 99214 OFFICE O/P EST MOD 30 MIN: CPT | Performed by: UROLOGY

## 2023-08-18 NOTE — LETTER
August 18, 2023     [place], [address] [address] [address]    Patient: Ángel Dee   YOB: 1945   Date of Visit: 8/18/2023       Dear Dr. Shante Weir:    Thank you for referring Kamlesh Flores to me for evaluation. Below are my notes for this consultation. If you have questions, please do not hesitate to call me. I look forward to following your patient along with you. Sincerely,        Jacqueline Russo MD        CC: MD Lucila Prescott MD Venus Nickel, MD  8/18/2023  4:37 PM  Sign when Signing Visit  Chief complaint: Prostate cancer (active surveillance)    HPI: 60-year-old male followed for prostate cancer on active surveillance. He was initially diagnosed in 2016 with biopsy positive for 1 core of [place] 3+3 equal 6 prostate cancer. He was followed conservatively. A multiparametric MRI in March 2022 revealed a very large (154 g prostate) with a low risk of clinically significant cancer. As his PSA was rising the patient recently underwent transperineal guided biopsies. The procedure was complicated by urinary retention. He is placed on Flomax and had a Pope for 2 days post procedure. Pope has been removed and he is now voiding well. There is no fever. He has minimal initial hematuria in the morning. Otherwise he feels he is voiding normally. He returns today to review his pathology report. Most recent PSA is 14.73 with 25% free PSA in January 2023. Biopsies were performed on August 8. All biopsies were benign. Impression: Very low risk prostate cancer-Based on biopsy of 2016. All biopsies were negative. PSA density is 9%. Plan: Options were discussed with the patient. All his biopsies were negative. There is certainly no indication to proceed with any therapy at this time. I doubt findings of his sacral spine MRI related to prostate cancer. If there is any concern a PSMA PET/CT can be performed.   We will continue active surveillance. He can resume normal activity and he may wish to do try to discontinue tamsulosin in the future. He will return in 1 year and we will recheck a PSA and urinalysis prior to that visit.

## 2023-08-18 NOTE — PROGRESS NOTES
Chief complaint: Prostate cancer (active surveillance)    HPI: 67-year-old male followed for prostate cancer on active surveillance. He was initially diagnosed in 2016 with biopsy positive for 1 core of [place] 3+3 equal 6 prostate cancer. He was followed conservatively. A multiparametric MRI in March 2022 revealed a very large (154 g prostate) with a low risk of clinically significant cancer. As his PSA was rising the patient recently underwent transperineal guided biopsies. The procedure was complicated by urinary retention. He is placed on Flomax and had a Pope for 2 days post procedure. Pope has been removed and he is now voiding well. There is no fever. He has minimal initial hematuria in the morning. Otherwise he feels he is voiding normally. He returns today to review his pathology report. Most recent PSA is 14.73 with 25% free PSA in January 2023. Biopsies were performed on August 8. All biopsies were benign. Impression: Very low risk prostate cancer-Based on biopsy of 2016. All biopsies were negative. PSA density is 9%. Plan: Options were discussed with the patient. All his biopsies were negative. There is certainly no indication to proceed with any therapy at this time. I doubt findings of his sacral spine MRI related to prostate cancer. If there is any concern a PSMA PET/CT can be performed. We will continue active surveillance. He can resume normal activity and he may wish to do try to discontinue tamsulosin in the future. He will return in 1 year and we will recheck a PSA and urinalysis prior to that visit.

## 2023-10-27 NOTE — TELEPHONE ENCOUNTER
RX for pressure change and resupply faxed to [facility] Full Thickness Lip Wedge Repair (Flap) Text: Given the location of the defect and the proximity to free margins a full thickness wedge repair was deemed most appropriate.  Using a sterile surgical marker, the appropriate repair was drawn incorporating the defect and placing the expected incisions perpendicular to the vermilion border.  The vermilion border was also meticulously outlined to ensure appropriate reapproximation during the repair.  The area thus outlined was incised through and through with a #15 scalpel blade.  The muscularis and dermis were reaproximated with deep sutures following hemostasis. Care was taken to realign the vermilion border before proceeding with the superficial closure.  Once the vermilion was realigned the superfical and mucosal closure was finished.

## 2024-03-26 ENCOUNTER — OFFICE VISIT (OUTPATIENT)
Dept: SLEEP CENTER | Facility: CLINIC | Age: 79
End: 2024-03-26
Payer: MEDICARE

## 2024-03-26 VITALS
WEIGHT: 190 LBS | DIASTOLIC BLOOD PRESSURE: 79 MMHG | HEIGHT: 70 IN | SYSTOLIC BLOOD PRESSURE: 123 MMHG | BODY MASS INDEX: 27.2 KG/M2

## 2024-03-26 DIAGNOSIS — G25.81 RESTLESS LEG SYNDROME: ICD-10-CM

## 2024-03-26 DIAGNOSIS — R68.2 DRY MOUTH: ICD-10-CM

## 2024-03-26 DIAGNOSIS — G47.33 OSA (OBSTRUCTIVE SLEEP APNEA): Primary | ICD-10-CM

## 2024-03-26 DIAGNOSIS — E66.3 OVERWEIGHT (BMI 25.0-29.9): ICD-10-CM

## 2024-03-26 DIAGNOSIS — F41.9 ANXIETY: ICD-10-CM

## 2024-03-26 PROCEDURE — G2211 COMPLEX E/M VISIT ADD ON: HCPCS | Performed by: INTERNAL MEDICINE

## 2024-03-26 PROCEDURE — 99214 OFFICE O/P EST MOD 30 MIN: CPT | Performed by: INTERNAL MEDICINE

## 2024-03-26 NOTE — PATIENT INSTRUCTIONS

## 2024-03-26 NOTE — PROGRESS NOTES
Follow-Up Note - Sleep Center   Emeterio Martinez  78 y.o. male  :1945  MRN:[id number]  DOS:3/26/2024    CC: I saw this patient for follow-up in clinic today for Sleep disordered breathing, Coexisting Sleep and Medical Problems.  Patient received ot a  Dream Station Version 2 machine from [employer] over a year ago.  Interval changes: None reported.    Results of prior studies in 2019: The diagnostic study confirmed moderate obstructive sleep apnea:  AHI 17/hour considerably higher while supine.   Intermittent snoring of moderate intensity was noted. Minimum oxygen saturation 81 %.  12.6 minutes of total sleep time was spent with saturations less than 90%.  There were also severe periodic limb movements of sleep.During the subsequent therapeutic study, sleep disordered breathing was adequately remediated with PAP at 9 cm H2O.  There were mild periodic limb movements of sleep     PFSH, Problem List, Medications & Allergies were reviewed in EMR.   He  has a past medical history of Adenocarcinoma of prostate, stage 1 (HCC) (2017), Anxiety (2021), BPH without obstruction/lower urinary tract symptoms, CPAP (continuous positive airway pressure) dependence, Elevated PSA, Erectile dysfunction, Hypercholesteremia, Hypertension, Malignant neoplasm prostate (HCC), GABRIELLE (obstructive sleep apnea), and Prostate nodule.    He has a current medication list which includes the following prescription(s): alprazolam, ascorbic acid, buspirone hcl, calcium, mirtazapine, rosuvastatin, sildenafil, tamsulosin, and valsartan-hydrochlorothiazide.    PHYSIOLOGICAL DATA REVIEW : Device has been used 29/30 days and average for all days is 3 hours and 53 minutes.  Using PAP > 4 hours/night 53%. Estimated IMELDA 1.6/hour with pressure of 10cm H2O@90th/95th percentile; patient has not been using non FDA approved devices to sanitize the machine.  INTERPRETATION: Compliance is good; Pressure setting is:optimal; ;   SUBJECTIVE: With  "respect to use of PAP, Emeterio  is experiencing some adverse effects:removes mask unknowingly; dry mouth is improved.He derives benefit.  Is satisfied with sleep and daytime function.   Sleep Routine: Emeterio reports getting 6 hrs sleep; he has no difficulty initiating or maintaining sleep . He arises spontaneously and feels refreshed.Emeterio denies excessive daytime sleepiness,.  He rated himself at Total score: 6 /24 on the McMillan Sleepiness Scale.   Other issues: Restless legs occur infrequently and not affecting sleep..     Habits: Reports that he has never smoked. He has never used smokeless tobacco.,  Reports current alcohol use.,  Reports no history of drug use., Caffeine use:limited; Exercise routine: \"not enough\".      ROS: Significant for weight has been stable.  Anxiety is controlled on current medication.  A 10 point review of systems was otherwise negative..    EXAM: /79 (BP Location: Left arm, Patient Position: Sitting, Cuff Size: Large)   Ht 5' 10\" (1.778 m)   Wt 86.2 kg (190 lb)   BMI 27.26 kg/m²     Wt Readings from Last 3 Encounters:   03/26/24 86.2 kg (190 lb)   02/27/24 86.2 kg (190 lb)   12/05/23 86.2 kg (190 lb)      Patient is well groomed; well appearing.   CNS: Alert, orientated, speech clear & coherent  Psych: cooperative and in no distress. Mental state: Appears normal.  H&N: EOMI; NC/AT: No facial pressure marks, no rashes.    Skin/Extrem: col & hydration normal; no edema  Resp: Respiratory effort is normal  Physical findings otherwise essentially unchanged from previous.    IMPRESSION: Problem List Items & Comorbidities Addressed this Visit    1. GABRIELLE (obstructive sleep apnea)  PAP DME Resupply/Reorder      2. Restless leg syndrome        3. Dry mouth        4. Anxiety        5. Overweight (BMI 25.0-29.9)            PLAN:  I reviewed results of prior studies and physiologic data with the patient.   I discussed treatment options with risks and benefits.  Treatment with  PAP is " "medically necessary and Storm is agreable to continue use.   Care of equipment, methods to improve comfort using PAP and importance of compliance with therapy were discussed.  Pressure setting: continue 9-11 cmH2O.    Rx provided to replace supplies and Care coordinated with DME provider.   I advised on sleep hygiene specifically increasing daily exercise that may improve sleep duration  Discussed strategies for weight reduction.    Follow-up is advised in 1 year or sooner if needed to monitor progress, compliance and to adjust therapy.     Thank you for allowing me to participate in the care of this patient.    Sincerely,     Authenticated electronically on 03/26/24   Board Certified Specialist     Portions of the record may have been created with voice recognition software. Occasional wrong word or \"sound a like\" substitutions may have occurred due to the inherent limitations of voice recognition software. There may also be notations and random deletions of words or characters from malfunctioning software. Read the chart carefully and recognize, using context, where substitutions/deletions have occurred.    "

## 2024-03-28 ENCOUNTER — TELEPHONE (OUTPATIENT)
Dept: SLEEP CENTER | Facility: CLINIC | Age: 79
End: 2024-03-28

## 2024-03-28 LAB
DME PARACHUTE DELIVERY DATE REQUESTED: NORMAL
DME PARACHUTE ITEM DESCRIPTION: NORMAL
DME PARACHUTE ORDER STATUS: NORMAL
DME PARACHUTE SUPPLIER NAME: NORMAL
DME PARACHUTE SUPPLIER PHONE: NORMAL

## 2024-04-01 LAB

## 2024-07-01 ENCOUNTER — TELEPHONE (OUTPATIENT)
Age: 79
End: 2024-07-01

## 2024-07-01 DIAGNOSIS — R33.9 URINARY RETENTION: ICD-10-CM

## 2024-07-01 RX ORDER — TAMSULOSIN HYDROCHLORIDE 0.4 MG/1
0.4 CAPSULE ORAL
Qty: 90 CAPSULE | Refills: 1 | Status: SHIPPED | OUTPATIENT
Start: 2024-07-01 | End: 2025-06-26

## 2024-07-01 NOTE — TELEPHONE ENCOUNTER
Patient called today to schedule one year follow up  around 8/18 with Dr Mcclure.    Please review due to no available appointments with provider.    Can patient be seen by AP?    Call back [phone]

## 2024-07-01 NOTE — TELEPHONE ENCOUNTER
Spoke with pt and scheduled for 8-27 with dr Mcclure. Pt aware to have psa and urine done prior to appt. He goes outside [facility] so orders will be mailed to him.

## 2024-07-01 NOTE — TELEPHONE ENCOUNTER
Pt returning the call to schedule a yearly follow up with , asking for a return once again to get on the provider's schedule.

## 2024-07-01 NOTE — TELEPHONE ENCOUNTER
Reason for call:   [x] Refill   [] Prior Auth  [] Other:     Office:   [x] Specialty/Provider - uro / [facility]    Medication: tamsulosin    Dose/Frequency: 0.4mg qd    Quantity: 90    Pharmacy: [facility] #[id number] - [place] TRA[place] - [address] [place] [address]     Does the patient have enough for 3 days?   [x] Yes   [] No - Send as HP to POD

## 2024-08-27 ENCOUNTER — OFFICE VISIT (OUTPATIENT)
Dept: UROLOGY | Facility: MEDICAL CENTER | Age: 79
End: 2024-08-27
Payer: MEDICARE

## 2024-08-27 VITALS
DIASTOLIC BLOOD PRESSURE: 80 MMHG | OXYGEN SATURATION: 94 % | HEIGHT: 70 IN | BODY MASS INDEX: 27.63 KG/M2 | SYSTOLIC BLOOD PRESSURE: 142 MMHG | WEIGHT: 193 LBS | HEART RATE: 71 BPM

## 2024-08-27 DIAGNOSIS — C61 MALIGNANT NEOPLASM OF PROSTATE (HCC): Primary | ICD-10-CM

## 2024-08-27 PROCEDURE — 99214 OFFICE O/P EST MOD 30 MIN: CPT | Performed by: UROLOGY

## 2024-08-27 NOTE — PROGRESS NOTES
Ambulatory Visit  Name: Emeterio Martinez      : 1945      MRN: [id number]  Encounter Provider: Vaibhav Mcclure MD  Encounter Date: 2024   Encounter department: [facility] [place]    Assessment & Plan   1. Malignant neoplasm of prostate (HCC)  Assessment & Plan:  He remains on active observation.  He is voiding well on Flomax.  AUA symptom score is 5.  Prostate biopsies last year were negative.  Most recent PSA is improved at 12.54.  Urinalysis is negative.  Multiparametric MRI in the past revealed a greater than 150 g prostate.  His PSA density thus remains low.  Digital rectal examination is palpably benign.  I feel he is doing well.  In light of the negative biopsies last year we can probably consider de-escalation.  I recommended he return in 1 year.  We will consider repeating a multiparametric MRI at that time.  We will also recheck a PSA at that time.  He will remain on tamsulosin.  Orders:  -     PSA, total and free; Future; Expected date: 2024  -     Urinalysis with microscopic; Future; Expected date: 2025      History of Present Illness     Emeterio Martinez is a 78 y.o. male who presents for follow-up.  He remains on active surveillance for clinically localized low risk prostate cancer.  He has been on active surveillance since 2016.  Confirmatory biopsies last year were negative.  He remains on tamsulosin for lower urinary tract symptoms.  He is voiding well.  His stream is good.  He feels he empties adequately.  There is no gross hematuria, dysuria or symptoms of infection.  He gets up at most once a night to urinate.  He returns today for follow-up.    Review of Systems   Constitutional: Negative.  Negative for chills, diaphoresis, fatigue and fever.   HENT: Negative.     Eyes: Negative.    Respiratory: Negative.     Cardiovascular: Negative.    Gastrointestinal: Negative.    Endocrine: Negative.    Genitourinary:         See HPI   Musculoskeletal:  "Negative.    Skin: Negative.    Allergic/Immunologic: Negative.    Neurological: Negative.    Hematological: Negative.    Psychiatric/Behavioral: Negative.         AUA SYMPTOM SCORE      Flowsheet Row Most Recent Value   AUA SYMPTOM SCORE    How often have you had a sensation of not emptying your bladder completely after you finished urinating? 1 (P)     How often have you had to urinate again less than two hours after you finished urinating? 2 (P)     How often have you found you stopped and started again several times when you urinate? 0 (P)     How often have you found it difficult to postpone urination? 1 (P)     How often have you had a weak urinary stream? 0 (P)     How often have you had to push or strain to begin urination? 0 (P)     How many times did you most typically get up to urinate from the time you went to bed at night until the time you got up in the morning? 1 (P)     Quality of Life: If you were to spend the rest of your life with your urinary condition just the way it is now, how would you feel about that? 1 (P)     AUA SYMPTOM SCORE 5 (P)            Objective     /80   Pulse 71   Ht 5' 10\" (1.778 m)   Wt 87.5 kg (193 lb)   SpO2 94%   BMI 27.69 kg/m²   Physical Exam  Vitals reviewed.   Constitutional:       General: He is not in acute distress.     Appearance: Normal appearance. He is well-developed and normal weight. He is not ill-appearing, toxic-appearing or diaphoretic.   HENT:      Head: Normocephalic and atraumatic.   Eyes:      General: No scleral icterus.     Conjunctiva/sclera: Conjunctivae normal.   Cardiovascular:      Rate and Rhythm: Normal rate.   Pulmonary:      Effort: Pulmonary effort is normal.   Abdominal:      General: Bowel sounds are normal. There is no distension.      Palpations: Abdomen is soft. There is no mass.      Tenderness: There is no abdominal tenderness. There is no right CVA tenderness, left CVA tenderness, guarding or rebound.      Hernia: No hernia " "is present.   Genitourinary:     Penis: Normal. No phimosis or hypospadias.       Testes: Normal.         Right: Mass not present.         Left: Mass not present.      Rectum: Normal.      Comments: Prostate 3x enlarged and palpably benign.   Musculoskeletal:         General: Normal range of motion.      Cervical back: Normal range of motion and neck supple.   Skin:     General: Skin is warm and dry.   Neurological:      General: No focal deficit present.      Mental Status: He is alert and oriented to person, place, and time.   Psychiatric:         Mood and Affect: Mood normal.         Behavior: Behavior normal.         Thought Content: Thought content normal.         Judgment: Judgment normal.       Results  No results found for: \"PSA\"  Lab Results   Component Value Date    CALCIUM 9.7 08/06/2024    K 3.9 08/06/2024    CO2 27 08/06/2024     08/06/2024    BUN 18 08/06/2024    CREATININE 1.16 08/06/2024     Lab Results   Component Value Date    WBC 7.67 07/25/2023    HGB 14.2 07/25/2023    HCT 45.1 07/25/2023    MCV 91 07/25/2023     07/25/2023       Office Urine Dip  No results found for this or any previous visit (from the past 1 hour(s)).]    Administrative Statements           "

## 2024-08-27 NOTE — PATIENT INSTRUCTIONS
"  Patient Education     Choosing treatment for low-risk localized prostate cancer   The Basics   Written by the doctors and editors at [facility]   What is low-risk localized prostate cancer? -- Low-risk localized prostate cancer is prostate cancer that is very slow growing and only in the prostate gland (figure 1). It has not spread outside of the prostate gland.  Because this type of prostate cancer usually grows slowly, it does not usually lead to death from the cancer. Many men with low-risk localized prostate cancer die from other medical problems, such as heart disease, and not from their prostate cancer.  But some low-risk localized prostate cancers will become serious and can lead to death if not treated. Unfortunately, doctors often can't tell for sure which low-risk localized prostate cancers will never cause any problems and which ones are more serious.  What are the treatment choices for low-risk localized prostate cancer? -- In general, most men can choose from the following different treatments for low-risk localized prostate cancer:   Active surveillance - If you choose this option, you will not have treatment for your cancer right away. But your doctor will do exams and tests on a regular basis. This is to check whether the cancer is growing or becoming more aggressive. If and when it does, you will then start active treatment. Many but not all men who choose active surveillance end up having treatment for their cancer at some later point.   Prostatectomy, which is surgery to remove the prostate gland   Radiation therapy - Radiation kills cancer cells. You can get radiation therapy in 2 different ways:   In \"external-beam radiation therapy,\" the radiation comes from a machine that is outside the body. This involves getting radiation every day, Monday through Friday, for 4 to 8 weeks.    In \"brachytherapy,\" the radiation comes from a source of radiation that is put into the prostate gland. This involves " "a 1-time treatment.  Some men, especially those who are older or have serious medical conditions, might choose not to do any of the above. Instead, they might choose \"watchful waiting.\" Watchful waiting is not exactly the same as active surveillance. It does not require regular testing, but involves treating symptoms if they happen.  How do I choose among the different treatments? -- First, make sure you understand all the facts about your choices (table 1). Ask your doctor any questions you have. Then to help you decide, think about how you feel about:   What the treatment involves   Benefits of the treatment - With active surveillance, you can delay and perhaps avoid the side effects from surgery or radiation. With surgery, external-beam radiation therapy, and brachytherapy, your cancer is treated right away before it can grow or spread.   Downsides and side effects of the treatment - If you choose active surveillance, you will need to be monitored by your doctor for a long time, possibly for the rest of your life. This might include having many biopsies and/or imaging tests. Another downside of active surveillance is knowing you have cancer and worrying about it. Also, sometimes the cancer starts growing quickly and becomes harder to treat.  Surgery, external-beam radiation therapy, and brachytherapy can all cause problems with sex. This includes trouble getting or keeping an erection. With surgery, this problem usually happens right away. With external-beam radiation therapy and brachytherapy, this problem can happen later on.  Surgery can cause incontinence, or leaking of urine. Also, side effects such as pain, infection, and bleeding can happen with any type of surgery.  External-beam radiation therapy and brachytherapy have short-term and long-term side effects. Short-term, they can cause frequent bowel movements and other bowel problems. They can also cause problems with urination, including the need to " urinate often or pain with urination. Long-term, these treatments sometimes cause incontinence, but this is less common than with surgery.   How the treatment could affect your sex life - An important issue for many men is how treatment can affect their sex life. Active surveillance doesn't usually affect your sex life. But surgery, external-beam radiation therapy, and brachytherapy can cause trouble getting or keeping an erection. If you have problems with these, there are treatments (medicines or devices) that might be able to help.  Your treatment might also depend on your age and general health. For example, younger men in good health often choose surgery. Older men, especially those with other medical conditions, might choose active surveillance.  Also, people with certain medical conditions cannot have some treatments. For instance, men with long-term diarrhea usually can't have external-beam radiation therapy or brachytherapy. Brachytherapy is not usually recommended for men who have very a large prostate that is causing problems with urinating.  What are the chances my cancer will come back after treatment? -- After treatment with surgery or radiation therapy, there is a very low chance that your cancer will come back.  How do I work with my doctor to make a decision? -- To make a decision, let your doctor know how you feel about the different treatments and whether there is something specific that worries you. Then listen to what your doctor has to say about their experiences with men who had situations similar to yours. Together, you can decide which treatment is right for you.  All topics are updated as new evidence becomes available and our peer review process is complete.  This topic retrieved from [employer] on: Feb 26, 2024.  Topic 12359 Version 13.0  Release: 32.2.4 - C32.56  © 2024 UpToDate, Inc. and/or its affiliates. All rights reserved.  figure 1: Prostate gland     This drawing shows male internal  organs and a close-up of the prostate gland.  Graphic 97526 Version 5.0  table 1: Choosing treatment for low-risk localized prostate cancer     Active surveillance  Prostatectomy (surgery to remove the prostate gland)  External-beam radiation therapy  Brachytherapy    What are the benefits? You can delay and perhaps avoid the side effects that can come with surgery or radiation. The cancer is treated right away. The cancer is treated right away. The cancer is treated right away.   What are the downsides or side effects? You will need regular monitoring and testing.  You know that you have cancer in your body, which might worry you.  Your cancer might start growing more quickly and be harder to treat. This is major surgery. All surgeries can cause pain, infection, or bleeding.  Side effects can include trouble with sex and leaking urine. Short-term side effects include having frequent bowel movements or urinating often.  Long-term side effects can include trouble with sex or having frequent bowel movements. You need to avoid being near children or pregnant women during treatment.  Short-term side effects can include having frequent bowel movements, urinating a lot, or having pain with urination.  Long-term side effects can include trouble with sex and leaking urine.   Will I need further treatment? Yes, if your cancer starts to grow or become more aggressive. Probably not Probably not Probably not   What is the chance that my cancer will come back after treatment? The cancer stays in your body until it is treated. Many men will have treatment for their cancer at some point. Very low Very low Very low   Graphic 52051 Version 5.0  Consumer Information Use and Disclaimer   Disclaimer: This generalized information is a limited summary of diagnosis, treatment, and/or medication information. It is not meant to be comprehensive and should be used as a tool to help the user understand and/or assess potential diagnostic and  treatment options. It does NOT include all information about conditions, treatments, medications, side effects, or risks that may apply to a specific patient. It is not intended to be medical advice or a substitute for the medical advice, diagnosis, or treatment of a health care provider based on the health care provider's examination and assessment of a patient's specific and unique circumstances. Patients must speak with a health care provider for complete information about their health, medical questions, and treatment options, including any risks or benefits regarding use of medications. This information does not endorse any treatments or medications as safe, effective, or approved for treating a specific patient. UpToDate, Inc. and its affiliates disclaim any warranty or liability relating to this information or the use thereof.The use of this information is governed by the Terms of Use, available at https://www.[employer].com/en/know/clinical-effectiveness-terms. 2024© UpToDate, Inc. and its affiliates and/or licensors. All rights reserved.  Copyright   © 2024 UpToDate, Inc. and/or its affiliates. All rights reserved.

## 2024-08-27 NOTE — ASSESSMENT & PLAN NOTE
He remains on active observation.  He is voiding well on Flomax.  AUA symptom score is 5.  Prostate biopsies last year were negative.  Most recent PSA is improved at 12.54.  Urinalysis is negative.  Multiparametric MRI in the past revealed a greater than 150 g prostate.  His PSA density thus remains low.  Digital rectal examination is palpably benign.  I feel he is doing well.  In light of the negative biopsies last year we can probably consider de-escalation.  I recommended he return in 1 year.  We will consider repeating a multiparametric MRI at that time.  We will also recheck a PSA at that time.  He will remain on tamsulosin.

## 2024-12-17 NOTE — PROGRESS NOTES
8/10/2023    Gurinder Caraballo  1945  [id number]    Diagnosis  Chief Complaint    Prostate Cancer; Urinary Retention         Patient presents for lang catheter removal s/p acute urinary retention s/pTRANSPERINEAL ULTRASOUND GUIDED BIOPSY PROSTATE   managed by [address] Herrera  Return to the office this afternoon for PVR if unable to urinate. Return to the office on 8- with Dr. Mary Ann Gómez for pathology review and discussion. Procedure Lang removal/voiding trial    Lang catheter removed after deflation of an intact balloon. Patient tolerated well. Encouraged patient to hydrate well and return this afternoon for post void residual.  he knows he may return early if uncomfortable and unable to urinate. Patient agrees to this plan. Call placed to patient and spoke with him. Pt advised he urinated x2 since having the lang catheter removed this morning. Pt had a stream. He cancelled his appointment for this afternoon for PVR. He is scheduled for follow up with Dr. Gio Solano on 8-. ER precautions were reviewed with the patient if he is unable to urinate or symptoms worsen.  He verbalized his understanding       Vitals:    08/10/23 0910   BP: 142/70   BP Location: Left arm   Patient Position: Standing   Cuff Size: Standard   Weight: 86.2 kg (190 lb)   Height: 5' 10" (1.778 m)           Adrian Cruz RN [Alert] : alert [Well Nourished] : well nourished [No Acute Distress] : no acute distress [Well Developed] : well developed [Normal Sclera/Conjunctiva] : normal sclera/conjunctiva [No Proptosis] : no proptosis [Thyroid Not Enlarged] : the thyroid was not enlarged [No Thyroid Nodules] : no palpable thyroid nodules [Clear to Auscultation] : lungs were clear to auscultation bilaterally [Normal S1, S2] : normal S1 and S2 [Normal Rate] : heart rate was normal [Regular Rhythm] : with a regular rhythm [Not Tender] : non-tender [Not Distended] : not distended [Soft] : abdomen soft [No Spinal Tenderness] : no spinal tenderness [Spine Straight] : spine straight [No Stigmata of Cushings Syndrome] : no stigmata of Cushings Syndrome [Normal Gait] : normal gait [No Tremors] : no tremors [Oriented x3] : oriented to person, place, and time [Normal Affect] : the affect was normal

## 2024-12-19 DIAGNOSIS — R33.9 URINARY RETENTION: ICD-10-CM

## 2024-12-19 RX ORDER — TAMSULOSIN HYDROCHLORIDE 0.4 MG/1
0.4 CAPSULE ORAL
Qty: 90 CAPSULE | Refills: 1 | Status: SHIPPED | OUTPATIENT
Start: 2024-12-19 | End: 2025-12-14

## 2025-04-04 ENCOUNTER — OFFICE VISIT (OUTPATIENT)
Dept: SLEEP CENTER | Facility: CLINIC | Age: 80
End: 2025-04-04
Payer: MEDICARE

## 2025-04-04 VITALS
OXYGEN SATURATION: 91 % | HEIGHT: 70 IN | HEART RATE: 75 BPM | SYSTOLIC BLOOD PRESSURE: 116 MMHG | WEIGHT: 193 LBS | DIASTOLIC BLOOD PRESSURE: 70 MMHG | BODY MASS INDEX: 27.63 KG/M2

## 2025-04-04 DIAGNOSIS — E66.3 OVERWEIGHT (BMI 25.0-29.9): ICD-10-CM

## 2025-04-04 DIAGNOSIS — R68.2 DRY MOUTH: ICD-10-CM

## 2025-04-04 DIAGNOSIS — G47.33 OSA (OBSTRUCTIVE SLEEP APNEA): Primary | ICD-10-CM

## 2025-04-04 DIAGNOSIS — F41.9 ANXIETY: ICD-10-CM

## 2025-04-04 DIAGNOSIS — G25.81 RESTLESS LEG SYNDROME: ICD-10-CM

## 2025-04-04 PROCEDURE — 99214 OFFICE O/P EST MOD 30 MIN: CPT | Performed by: INTERNAL MEDICINE

## 2025-04-04 PROCEDURE — G2211 COMPLEX E/M VISIT ADD ON: HCPCS | Performed by: INTERNAL MEDICINE

## 2025-04-04 NOTE — PROGRESS NOTES
Name: Emeterio Martinez      : 1945      MRN: [id number]  Encounter Provider: Monty Pillai MD  Encounter Date: 2025   Encounter department: [facility] [place]  :  Assessment & Plan  GABRIELLE (obstructive sleep apnea)    Orders:    PAP DME Resupply/Reorder    Restless leg syndrome         Dry mouth         Anxiety         Overweight (BMI 25.0-29.9)               PLAN:   Problems & Comorbidities Addressed this Visit as listed.  Above conditions as reviewed in notes are improved/stable/controlled/resolved.  I reviewed results of prior studies and physiologic data with the patient.   I discussed treatment options with risks and benefits.  Treatment with  PAP is medically necessary and Kavon is agreable to continue use.   Care of equipment, methods to improve comfort using PAP and importance of compliance with therapy were discussed.  Pressure setting:continue 9-11 cmH2O. Mask type nasal pillows  Rx provided to replace supplies and Care coordinated with DME provider.   The patient's current unit has now reached its 5 yr reasonable, useful life and needs to be replaced.  He will call for a prescription few months prior to his next visit.  No medication is needed for RLS.  Discussed strategies for weight reduction.    Follow-up is advised in 1 year or sooner if needed to monitor progress, compliance and to adjust therapy.        History of Present Illness   HPI          Follow-Up Note - Sleep Center   Emeterio Martinez  79 y.o. male  :1945  MRN:[id number]  DOS:2025    CC: I saw this patient for follow-up in clinic today for Sleep disordered breathing, Coexisting Sleep and Medical Problems.  Patient received ot a  [employer] Version 2 machine from [employer] over a year ago.. Interval changes: None reported.      Results of prior studies in 2019: The diagnostic study confirmed moderate obstructive sleep apnea:  AHI 17/hour considerably higher while supine.   Intermittent snoring of  moderate intensity was noted. Minimum oxygen saturation 81 %.  12.6 minutes of total sleep time was spent with saturations less than 90%.  There were also severe periodic limb movements of sleep.During the subsequent therapeutic study, sleep disordered breathing was adequately remediated with PAP at 9 cm H2O.  There were mild periodic limb movements of sleep     PFSH, Problem List, Medications & Allergies were reviewed in EMR.   He  has a past medical history of Adenocarcinoma of prostate, stage 1 (HCC) (12/27/2017), Anxiety (05/21/2021), BPH without obstruction/lower urinary tract symptoms, CPAP (continuous positive airway pressure) dependence, Elevated PSA, Erectile dysfunction, Hypercholesteremia, Hypertension, Malignant neoplasm prostate (HCC), GABRIELLE (obstructive sleep apnea), and Prostate nodule.    He has a current medication list which includes the following prescription(s): alprazolam, ascorbic acid, buspirone hcl, calcium, mirtazapine, rosuvastatin, tamsulosin, valsartan-hydrochlorothiazide, and sildenafil.    PHYSIOLOGICAL DATA REVIEW : Device has been used 26/30 days an average on days used is 3 hours and 9 minutes.   using PAP > 4 hours/night 26.7%. Estimated IMELDA 3.9/hour with pressure of 10.5cm H2O@90th/95th percentile; he was unable to use CPAP consistently because of a upper respiratory infection..  INTERPRETATION: Compliance is poor; Pressure setting is:within target range; ;     SUBJECTIVE: With respect to use of PAP, Emeterio  is experiencing no adverse effects:.He derives benefit.. Is satisfied with sleep and daytime function.     Sleep Routine: Emeterio reports getting (Patient-Rptd) (P) 7 hrs sleep; he has no difficulty initiating and maintaining sleep . He arises spontaneously and feels refreshed.Emeterio]reports excessive daytime sleepiness, takes planned naps occasionally for around 30 minutes.  He rated himself at Total score: (Patient-Rptd) (P) 6 /24 on the [place] Sleepiness Scale.   Other issues:  "Restless legs symptoms occur infrequently.     Habits: Reports that he has never smoked. He has never used smokeless tobacco.,  Reports current alcohol use.,  Reports no history of drug use., Caffeine use: limited until  ; Exercise routine: regular.      ROS: Significant for weight has been stable.  Is reporting no nasal, respiratory or cardiac symptoms.  Anxiety is fairly well-controlled on Remeron and he uses alprazolam infrequently..    EXAM: /70 (BP Location: Left arm, Patient Position: Sitting, Cuff Size: Large)   Pulse 75   Ht 5' 10\" (1.778 m)   Wt 87.5 kg (193 lb)   SpO2 91%   BMI 27.69 kg/m²     Wt Readings from Last 3 Encounters:   04/04/25 87.5 kg (193 lb)   02/20/25 87.5 kg (193 lb)   11/19/24 87.5 kg (193 lb)      Patient is well groomed; well appearing.   CNS: Alert, orientated, speech clear & coherent  Psych: cooperative and in no distress. Mental state: Appears normal.  H&N: EOMI; NC/AT: No facial pressure marks, no rashes.    Skin/Extrem: col & hydration normal; no edema  Resp: Respiratory effort is normal  Physical findings otherwise essentially unchanged from previous.    Sincerely,     Authenticated electronically on 04/04/25   Board Certified Specialist     Portions of the record may have been created with voice recognition software. Occasional wrong word or \"sound a like\" substitutions may have occurred due to the inherent limitations of voice recognition software. There may also be notations and random deletions of words or characters from malfunctioning software. Read the chart carefully and recognize, using context, where substitutions/deletions have occurred.          Review of Systems           "

## 2025-04-07 ENCOUNTER — TELEPHONE (OUTPATIENT)
Dept: SLEEP CENTER | Facility: CLINIC | Age: 80
End: 2025-04-07

## 2025-06-12 DIAGNOSIS — R33.9 URINARY RETENTION: ICD-10-CM

## 2025-06-12 RX ORDER — TAMSULOSIN HYDROCHLORIDE 0.4 MG/1
0.4 CAPSULE ORAL
Qty: 90 CAPSULE | Refills: 1 | Status: SHIPPED | OUTPATIENT
Start: 2025-06-12 | End: 2026-06-07

## 2025-06-27 DIAGNOSIS — R33.9 URINARY RETENTION: Primary | ICD-10-CM

## 2025-06-27 DIAGNOSIS — C61 MALIGNANT NEOPLASM OF PROSTATE (HCC): ICD-10-CM

## (undated) DEVICE — SPECIMEN CONTAINER STERILE PEEL PACK

## (undated) DEVICE — MAX-CORE® DISPOSABLE CORE BIOPSY INSTRUMENT, 18G X 25CM: Brand: MAX-CORE

## (undated) DEVICE — UTILITY MARKER,BLACK WITH LABELS: Brand: DEVON

## (undated) DEVICE — SYRINGE 10ML LL

## (undated) DEVICE — TELFA NON-ADHERENT ABSORBENT DRESSING: Brand: TELFA

## (undated) DEVICE — SCD SEQUENTIAL COMPRESSION COMFORT SLEEVE MEDIUM KNEE LENGTH: Brand: KENDALL SCD

## (undated) DEVICE — NEEDLE 25G X 1 1/2

## (undated) DEVICE — PREP PAD BNS: Brand: CONVERTORS

## (undated) DEVICE — 3M™ IOBAN™ 2 ANTIMICROBIAL INCISE DRAPE 6640EZ: Brand: IOBAN™ 2

## (undated) DEVICE — ULTRASOUND GEL STERILE FOIL PK

## (undated) DEVICE — ASTOUND STANDARD SURGICAL GOWN, XL: Brand: CONVERTORS

## (undated) DEVICE — 3M™ TRANSPORE™ WHITE SURGICAL TAPE 1534-1, 1 INCH X 10 YARD (2,5CM X 9,1M), 12 ROLLS/CARTON 10 CARTONS/CASE: Brand: 3M™ TRANSPORE™

## (undated) DEVICE — 60 ML SYRINGE,TOOMEY TYPE: Brand: MONOJECT

## (undated) DEVICE — CHLORAPREP HI-LITE 26ML ORANGE

## (undated) DEVICE — SYSTEM TRANSPERINEAL ACCESS PRECISIONPOINT

## (undated) DEVICE — 3M™ TEGADERM™ TRANSPARENT FILM DRESSING FRAME STYLE, 1628, 6 IN X 8 IN (15 CM X 20 CM), 10/CT 8CT/CASE: Brand: 3M™ TEGADERM™

## (undated) DEVICE — HEAVY DUTY TABLE COVER: Brand: CONVERTORS